# Patient Record
Sex: FEMALE | Race: WHITE | NOT HISPANIC OR LATINO | Employment: FULL TIME | URBAN - METROPOLITAN AREA
[De-identification: names, ages, dates, MRNs, and addresses within clinical notes are randomized per-mention and may not be internally consistent; named-entity substitution may affect disease eponyms.]

---

## 2019-06-07 ENCOUNTER — OFFICE VISIT CONVERTED (OUTPATIENT)
Dept: INTERNAL MEDICINE | Facility: CLINIC | Age: 29
End: 2019-06-07
Attending: INTERNAL MEDICINE

## 2019-07-29 ENCOUNTER — HOSPITAL ENCOUNTER (OUTPATIENT)
Dept: LAB | Facility: HOSPITAL | Age: 29
Discharge: HOME | End: 2019-07-29

## 2019-07-29 ENCOUNTER — OFFICE VISIT CONVERTED (OUTPATIENT)
Dept: INTERNAL MEDICINE | Facility: CLINIC | Age: 29
End: 2019-07-29
Attending: INTERNAL MEDICINE

## 2019-07-29 LAB
ALBUMIN SERPL-MCNC: 4.3 G/DL (ref 3.5–5)
ALBUMIN/GLOB SERPL: 1.4 {RATIO} (ref 1.4–2.6)
ALP SERPL-CCNC: 69 U/L (ref 42–98)
ALT SERPL-CCNC: 10 U/L (ref 10–40)
ANION GAP SERPL CALC-SCNC: 20 MMOL/L (ref 8–19)
AST SERPL-CCNC: 16 U/L (ref 15–50)
BASOPHILS # BLD AUTO: 0.05 10*3/UL (ref 0–0.2)
BASOPHILS NFR BLD AUTO: 0.8 % (ref 0–3)
BILIRUB SERPL-MCNC: 0.38 MG/DL (ref 0.2–1.3)
BUN SERPL-MCNC: 8 MG/DL (ref 5–25)
BUN/CREAT SERPL: 12 {RATIO} (ref 6–20)
CALCIUM SERPL-MCNC: 9 MG/DL (ref 8.7–10.4)
CHLORIDE SERPL-SCNC: 99 MMOL/L (ref 99–111)
CHOLEST SERPL-MCNC: 135 MG/DL (ref 107–200)
CHOLEST/HDLC SERPL: 2.5 {RATIO} (ref 3–6)
CONV ABS IMM GRAN: 0.01 10*3/UL (ref 0–0.2)
CONV CO2: 22 MMOL/L (ref 22–32)
CONV IMMATURE GRAN: 0.2 % (ref 0–1.8)
CONV TOTAL PROTEIN: 7.3 G/DL (ref 6.3–8.2)
CREAT UR-MCNC: 0.69 MG/DL (ref 0.5–0.9)
DEPRECATED RDW RBC AUTO: 46.4 FL (ref 36.4–46.3)
EOSINOPHIL # BLD AUTO: 0.11 10*3/UL (ref 0–0.7)
EOSINOPHIL # BLD AUTO: 1.8 % (ref 0–7)
ERYTHROCYTE [DISTWIDTH] IN BLOOD BY AUTOMATED COUNT: 15.2 % (ref 11.7–14.4)
GFR SERPLBLD BASED ON 1.73 SQ M-ARVRAT: >60 ML/MIN/{1.73_M2}
GLOBULIN UR ELPH-MCNC: 3 G/DL (ref 2–3.5)
GLUCOSE SERPL-MCNC: 82 MG/DL (ref 65–99)
HBA1C MFR BLD: 11.6 G/DL (ref 12–16)
HCT VFR BLD AUTO: 37.7 % (ref 37–47)
HDLC SERPL-MCNC: 55 MG/DL (ref 40–60)
LDLC SERPL CALC-MCNC: 66 MG/DL (ref 70–100)
LYMPHOCYTES # BLD AUTO: 2.62 10*3/UL (ref 1–5)
MCH RBC QN AUTO: 25.7 PG (ref 27–31)
MCHC RBC AUTO-ENTMCNC: 30.8 G/DL (ref 33–37)
MCV RBC AUTO: 83.6 FL (ref 81–99)
MONOCYTES # BLD AUTO: 0.4 10*3/UL (ref 0.2–1.2)
MONOCYTES NFR BLD AUTO: 6.7 % (ref 3–10)
NEUTROPHILS # BLD AUTO: 2.77 10*3/UL (ref 2–8)
NEUTROPHILS NFR BLD AUTO: 46.5 % (ref 30–85)
NRBC CBCN: 0 % (ref 0–0.7)
OSMOLALITY SERPL CALC.SUM OF ELEC: 281 MOSM/KG (ref 273–304)
PLATELET # BLD AUTO: 348 10*3/UL (ref 130–400)
PMV BLD AUTO: 9.6 FL (ref 9.4–12.3)
POTASSIUM SERPL-SCNC: 3.7 MMOL/L (ref 3.5–5.3)
RBC # BLD AUTO: 4.51 10*6/UL (ref 4.2–5.4)
SODIUM SERPL-SCNC: 137 MMOL/L (ref 135–147)
TRIGL SERPL-MCNC: 72 MG/DL (ref 40–150)
TSH SERPL-ACNC: 2.24 M[IU]/L (ref 0.27–4.2)
VARIANT LYMPHS NFR BLD MANUAL: 44 % (ref 20–45)
VLDLC SERPL-MCNC: 14 MG/DL (ref 5–37)
WBC # BLD AUTO: 5.96 10*3/UL (ref 4.8–10.8)

## 2019-11-20 ENCOUNTER — HOSPITAL ENCOUNTER (OUTPATIENT)
Dept: LAB | Facility: HOSPITAL | Age: 29
Discharge: HOME | End: 2019-11-20
Attending: INTERNAL MEDICINE

## 2019-11-20 LAB
BASOPHILS # BLD AUTO: 0.04 10*3/UL (ref 0–0.2)
BASOPHILS NFR BLD AUTO: 0.7 % (ref 0–3)
CONV ABS IMM GRAN: 0.01 10*3/UL (ref 0–0.2)
CONV IMMATURE GRAN: 0.2 % (ref 0–1.8)
DEPRECATED RDW RBC AUTO: 42.4 FL (ref 36.4–46.3)
EOSINOPHIL # BLD AUTO: 0.1 10*3/UL (ref 0–0.7)
EOSINOPHIL # BLD AUTO: 1.7 % (ref 0–7)
ERYTHROCYTE [DISTWIDTH] IN BLOOD BY AUTOMATED COUNT: 13.7 % (ref 11.7–14.4)
FOLATE SERPL-MCNC: 11.3 NG/ML (ref 4.8–20)
HCT VFR BLD AUTO: 37.5 % (ref 37–47)
HGB BLD-MCNC: 12.4 G/DL (ref 12–16)
IRON SATN MFR SERPL: 10 % (ref 20–55)
IRON SERPL-MCNC: 47 UG/DL (ref 60–170)
LYMPHOCYTES # BLD AUTO: 2.49 10*3/UL (ref 1–5)
LYMPHOCYTES NFR BLD AUTO: 42.7 % (ref 20–45)
MCH RBC QN AUTO: 28.4 PG (ref 27–31)
MCHC RBC AUTO-ENTMCNC: 33.1 G/DL (ref 33–37)
MCV RBC AUTO: 85.8 FL (ref 81–99)
MONOCYTES # BLD AUTO: 0.35 10*3/UL (ref 0.2–1.2)
MONOCYTES NFR BLD AUTO: 6 % (ref 3–10)
NEUTROPHILS # BLD AUTO: 2.84 10*3/UL (ref 2–8)
NEUTROPHILS NFR BLD AUTO: 48.7 % (ref 30–85)
NRBC CBCN: 0 % (ref 0–0.7)
PLATELET # BLD AUTO: 369 10*3/UL (ref 130–400)
PMV BLD AUTO: 9.7 FL (ref 9.4–12.3)
RBC # BLD AUTO: 4.37 10*6/UL (ref 4.2–5.4)
TIBC SERPL-MCNC: 469 UG/DL (ref 245–450)
TRANSFERRIN SERPL-MCNC: 328 MG/DL (ref 250–380)
VIT B12 SERPL-MCNC: 379 PG/ML (ref 211–911)
WBC # BLD AUTO: 5.83 10*3/UL (ref 4.8–10.8)

## 2019-11-21 ENCOUNTER — OFFICE VISIT CONVERTED (OUTPATIENT)
Dept: INTERNAL MEDICINE | Facility: CLINIC | Age: 29
End: 2019-11-21
Attending: INTERNAL MEDICINE

## 2019-11-27 ENCOUNTER — HOSPITAL ENCOUNTER (OUTPATIENT)
Dept: LAB | Facility: HOSPITAL | Age: 29
Discharge: HOME | End: 2019-11-27
Attending: INTERNAL MEDICINE

## 2019-11-27 LAB
T4 FREE SERPL-MCNC: 1.3 NG/DL (ref 0.9–1.8)
TSH SERPL-ACNC: 1.65 M[IU]/L (ref 0.27–4.2)

## 2019-12-06 ENCOUNTER — HOSPITAL ENCOUNTER (OUTPATIENT)
Dept: OTHER | Facility: HOSPITAL | Age: 29
Discharge: HOME | End: 2019-12-06
Attending: PHYSICIAN ASSISTANT

## 2019-12-06 ENCOUNTER — OFFICE VISIT CONVERTED (OUTPATIENT)
Dept: INTERNAL MEDICINE | Facility: CLINIC | Age: 29
End: 2019-12-06
Attending: PHYSICIAN ASSISTANT

## 2019-12-06 ENCOUNTER — CONVERSION ENCOUNTER (OUTPATIENT)
Dept: INTERNAL MEDICINE | Facility: CLINIC | Age: 29
End: 2019-12-06

## 2019-12-06 LAB
APPEARANCE UR: CLEAR
BILIRUB UR QL: NEGATIVE
COLOR UR: YELLOW
CONV COLLECTION SOURCE (UA): NORMAL
CONV UROBILINOGEN IN URINE BY AUTOMATED TEST STRIP: 0.2 {EHRLICHU}/DL (ref 0.1–1)
GLUCOSE UR QL: NEGATIVE MG/DL
HGB UR QL STRIP: NEGATIVE
KETONES UR QL STRIP: NEGATIVE MG/DL
LEUKOCYTE ESTERASE UR QL STRIP: NEGATIVE
NITRITE UR QL STRIP: NEGATIVE
PH UR STRIP.AUTO: 6 [PH] (ref 5–8)
PROT UR QL: NEGATIVE MG/DL
SP GR UR: 1.02 (ref 1–1.03)

## 2019-12-08 LAB — BACTERIA UR CULT: NORMAL

## 2020-01-02 ENCOUNTER — HOSPITAL ENCOUNTER (OUTPATIENT)
Dept: OTHER | Facility: HOSPITAL | Age: 30
Discharge: HOME | End: 2020-01-02
Attending: OBSTETRICS & GYNECOLOGY

## 2020-01-07 LAB
CONV LAST MENSTURAL PERIOD: NORMAL
SPECIMEN SOURCE: NORMAL
SPECIMEN SOURCE: NORMAL
THIN PREP CVX: NORMAL

## 2020-07-14 ENCOUNTER — HOSPITAL ENCOUNTER (OUTPATIENT)
Dept: URGENT CARE | Facility: CLINIC | Age: 30
Discharge: HOME | End: 2020-07-14
Attending: NURSE PRACTITIONER

## 2020-07-14 ENCOUNTER — HOSPITAL ENCOUNTER (OUTPATIENT)
Dept: OTHER | Facility: HOSPITAL | Age: 30
Discharge: HOME | End: 2020-07-14

## 2020-07-14 LAB
ALBUMIN SERPL-MCNC: 4 G/DL (ref 3.5–5)
ALBUMIN/GLOB SERPL: 1.4 {RATIO} (ref 1.4–2.6)
ALP SERPL-CCNC: 59 U/L (ref 42–98)
ALT SERPL-CCNC: 11 U/L (ref 10–40)
ANION GAP SERPL CALC-SCNC: 15 MMOL/L (ref 8–19)
AST SERPL-CCNC: 15 U/L (ref 15–50)
BASOPHILS # BLD AUTO: 0.05 10*3/UL (ref 0–0.2)
BASOPHILS NFR BLD AUTO: 0.4 % (ref 0–3)
BILIRUB SERPL-MCNC: 0.42 MG/DL (ref 0.2–1.3)
BUN SERPL-MCNC: 7 MG/DL (ref 5–25)
BUN/CREAT SERPL: 10 {RATIO} (ref 6–20)
CALCIUM SERPL-MCNC: 8.9 MG/DL (ref 8.7–10.4)
CHLORIDE SERPL-SCNC: 101 MMOL/L (ref 99–111)
CHOLEST SERPL-MCNC: 157 MG/DL (ref 107–200)
CHOLEST/HDLC SERPL: 2 {RATIO} (ref 3–6)
CONV ABS IMM GRAN: 0.03 10*3/UL (ref 0–0.2)
CONV CO2: 24 MMOL/L (ref 22–32)
CONV IMMATURE GRAN: 0.3 % (ref 0–1.8)
CONV TOTAL PROTEIN: 6.9 G/DL (ref 6.3–8.2)
CREAT UR-MCNC: 0.73 MG/DL (ref 0.5–0.9)
DEPRECATED RDW RBC AUTO: 42.5 FL (ref 36.4–46.3)
EOSINOPHIL # BLD AUTO: 0.14 10*3/UL (ref 0–0.7)
EOSINOPHIL # BLD AUTO: 1.2 % (ref 0–7)
ERYTHROCYTE [DISTWIDTH] IN BLOOD BY AUTOMATED COUNT: 12.9 % (ref 11.7–14.4)
GFR SERPLBLD BASED ON 1.73 SQ M-ARVRAT: >60 ML/MIN/{1.73_M2}
GLOBULIN UR ELPH-MCNC: 2.9 G/DL (ref 2–3.5)
GLUCOSE SERPL-MCNC: 86 MG/DL (ref 65–99)
HCT VFR BLD AUTO: 40.3 % (ref 37–47)
HDLC SERPL-MCNC: 78 MG/DL (ref 40–60)
HGB BLD-MCNC: 13 G/DL (ref 12–16)
LDLC SERPL CALC-MCNC: 55 MG/DL (ref 70–100)
LYMPHOCYTES # BLD AUTO: 1.71 10*3/UL (ref 1–5)
LYMPHOCYTES NFR BLD AUTO: 14.9 % (ref 20–45)
MCH RBC QN AUTO: 28.6 PG (ref 27–31)
MCHC RBC AUTO-ENTMCNC: 32.3 G/DL (ref 33–37)
MCV RBC AUTO: 88.8 FL (ref 81–99)
MONOCYTES # BLD AUTO: 0.59 10*3/UL (ref 0.2–1.2)
MONOCYTES NFR BLD AUTO: 5.1 % (ref 3–10)
NEUTROPHILS # BLD AUTO: 8.94 10*3/UL (ref 2–8)
NEUTROPHILS NFR BLD AUTO: 78.1 % (ref 30–85)
NRBC CBCN: 0 % (ref 0–0.7)
OSMOLALITY SERPL CALC.SUM OF ELEC: 279 MOSM/KG (ref 273–304)
PLATELET # BLD AUTO: 292 10*3/UL (ref 130–400)
PMV BLD AUTO: 9.2 FL (ref 9.4–12.3)
POTASSIUM SERPL-SCNC: 4.4 MMOL/L (ref 3.5–5.3)
RBC # BLD AUTO: 4.54 10*6/UL (ref 4.2–5.4)
SODIUM SERPL-SCNC: 136 MMOL/L (ref 135–147)
TRIGL SERPL-MCNC: 122 MG/DL (ref 40–150)
TSH SERPL-ACNC: 2.21 M[IU]/L (ref 0.27–4.2)
VLDLC SERPL-MCNC: 24 MG/DL (ref 5–37)
WBC # BLD AUTO: 11.46 10*3/UL (ref 4.8–10.8)

## 2020-07-15 ENCOUNTER — HOSPITAL ENCOUNTER (OUTPATIENT)
Dept: URGENT CARE | Facility: CLINIC | Age: 30
Discharge: HOME | End: 2020-07-15
Attending: NURSE PRACTITIONER

## 2020-07-15 LAB — SARS-COV-2 RNA SPEC QL NAA+PROBE: NOT DETECTED

## 2020-07-16 LAB — SARS-COV-2 RNA SPEC QL NAA+PROBE: NOT DETECTED

## 2020-10-29 ENCOUNTER — CONVERSION ENCOUNTER (OUTPATIENT)
Dept: PLASTIC SURGERY | Facility: CLINIC | Age: 30
End: 2020-10-29

## 2020-10-29 ENCOUNTER — OFFICE VISIT CONVERTED (OUTPATIENT)
Dept: PLASTIC SURGERY | Facility: CLINIC | Age: 30
End: 2020-10-29
Attending: PLASTIC SURGERY

## 2020-11-04 ENCOUNTER — TELEMEDICINE CONVERTED (OUTPATIENT)
Dept: INTERNAL MEDICINE | Facility: CLINIC | Age: 30
End: 2020-11-04
Attending: INTERNAL MEDICINE

## 2021-03-03 ENCOUNTER — HOSPITAL ENCOUNTER (OUTPATIENT)
Dept: OTHER | Facility: HOSPITAL | Age: 31
Discharge: HOME | End: 2021-03-03
Attending: OBSTETRICS & GYNECOLOGY

## 2021-04-14 ENCOUNTER — HOSPITAL ENCOUNTER (OUTPATIENT)
Dept: LAB | Facility: HOSPITAL | Age: 31
Discharge: HOME | End: 2021-04-14

## 2021-04-14 LAB
T4 FREE SERPL-MCNC: 1.3 NG/DL (ref 0.9–1.8)
TSH SERPL-ACNC: 1.43 M[IU]/L (ref 0.27–4.2)

## 2021-04-15 LAB
CONV ANTI MICROSOMAL AB: <9 IU/ML (ref 0–34)
T3FREE SERPL-MCNC: 3.6 PG/ML (ref 2–4.4)
THYROGLOBULIN ANTIBODY: <1 IU/ML (ref 0–0.9)

## 2021-04-20 LAB — T3REVERSE SERPL-MCNC: 21.9 NG/DL (ref 9.2–24.1)

## 2021-05-10 NOTE — H&P
"   History and Physical      Patient Name: Brittani Rosas   Patient ID: [id number]   Sex: Female   YOB: 1990    Primary Care Provider: Mabel England MD    Visit Date: 2020    Provider: Renay Lezama MD   Location: [facility]   Location Address: [address] [place] [address]  [id number]   Location Phone: [phone]          History Of Present Illness  Brittani Rosas is a 30 year old /White female who presents to the office today as a consult from REFERRING CARE PROVIDER NAME. Would like to discuss skin care, botox, and possible in office lipo. Had botox 6 months ago.       Past Medical History  Acne; Allergies; Anemia; Anxiety; Depression (emotion); Facial aging; History of cold sores         Past Surgical History  [place] Tooth Extraction         Medication List  Ortho Tri-Cyclen (28) 0.18/0.215/0.25 mg-35 mcg (28) oral tablet; sertraline 100 mg oral tablet         Allergy List  Shellfish allergy; SULFA (SULFONAMIDES)         Family Medical History  Heart Disease         Reproductive History   1 Para 1 0 0 0       Social History  Alcohol (Light); Tobacco (Never)         Review of Systems  · Cardiovascular  o Denies  o : chest pain, lower extremity edema, Heart Attack, Abnormal EKG  · Respiratory  o Denies  o : shortness of breath, wheezing, cough  · Neurologic  o Denies  o : muscular weakness, incoordination, tingling or numbness, headaches  · Psychiatric  o Denies  o : anxiety, depression, difficulty sleeping      Vitals  Date Time BP Position Site L\R Cuff Size HR RR TEMP (F) WT  HT  BMI kg/m2 BSA m2 O2 Sat FR L/min FiO2        10/29/2020 03:07 /82 Sitting    100 - R  98.2  5'  3\"   97 %  21%          Physical Examination  · Head and Face  o Face  o :   § Inspection  § : Dynamic greater than static rhytids of forehead, glabella and periorbital area, scattered brown spots with 1 larger 5mm round spot just above right " eyebrow; fullness submental area above platysma  o Visia  o :   § Spots  § : 56%  § Wrinkles  § : 17%  § Texture  § : 58%  § Pores  § : 63%  § UV Spots  § : 27%  § Brown Spots  § : 15%  § Red Areas  § : 14%  § Porphyrins  § : 54%  · Respiratory  o Respiratory Effort  o : breathing unlabored   · Cardiovascular  o Heart  o : regular rate          Assessment  · Facial aging     701.8/R23.8      Plan  · Orders  o Plastics cosmetic visit ([facility]) - - 10/29/2020  · Medications  o Medications have been Reconciled  o Transition of Care or Provider Policy  · Instructions  o Photos were obtained  o VISIA analysis was obtained and discussed with the patient.   o Will get quote for lipo. Started on GSR, DPD, and purchased Wrinkle TXT Repair, prescription given and discussed retinol side effects.  o Electronically Identified Patient Education Materials Provided Electronically            Electronically Signed by: Renay Lezama MD -Author on October 30, 2020 11:40:55 AM

## 2021-05-13 NOTE — PROGRESS NOTES
"   Progress Note      Patient Name: Brittani Rosas   Patient ID: [id number]   Sex: Female   YOB: 1990    Primary Care Provider: Mabel England MD    Visit Date: November 4, 2020    Provider: Mabel England MD   Location: [facility]   Location Address: [address] [place] [address] 3  [place], [place]  [id number]   Location Phone: [phone]          Chief Complaint  · \"I am following up on my Zoloft\"      History Of Present Illness  Video Conferencing Visit  Brittani Rosas is a 30 year old /White female who is presenting for evaluation via video conferencing via Junar. Verbal consent obtained before beginning visit.   The following staff were present during this visit: none   Brittani Rosas is a 30 year old /White female who presents for evaluation and treatment of:      Zoloft working well  no chest pain  no trouble breathing  does feel like she is constantly clearing her throat  she  tried Zyrtec without relief         Past Medical History  Disease Name Date Onset Notes   Acne --  --    Allergies --  --    Anemia --  --    Anxiety --  --    Depression (emotion) --  --    Facial aging --  --    History of cold sores --  --          Past Surgical History  Procedure Name Date Notes   [place] Tooth Extraction 2007 --          Medication List  Name Date Started Instructions   Allegra Allergy 180 mg oral tablet 11/04/2020 take 1 tablet (180 mg) by oral route once daily   omeprazole 40 mg oral capsule,delayed release(DR/EC) 11/04/2020 Take 1 capsule by mouth once daily   Ortho Tri-Cyclen (28) 0.18/0.215/0.25 mg-35 mcg (28) oral tablet  take 1 tablet by oral route once daily   sertraline 100 mg oral tablet 11/04/2020 TAKE 1 TABLET BY MOUTH EVERY DAY         Allergy List  Allergen Name Date Reaction Notes   Shellfish allergy --  --  --    SULFA (SULFONAMIDES) --  --  --        Allergies Reconciled  Family Medical History  Disease Name Relative/Age Notes   Heart Disease "  --          Reproductive History  Menstrual   Last Menstrual Period: 2019   Pregnancy Summary   Total Pregnancies: 1 Full Term: 1 Premature: 0   Ab Induced: 0 Ab Spontaneous: 0 Ectopics: 0   Multiples: 0 Livin         Social History  Finding Status Start/Stop Quantity Notes   Alcohol Light --/-- --  --    Tobacco Never --/-- --  --          Review of Systems  · Constitutional  o Denies  o : fever, fatigue, weight loss, weight gain  · Cardiovascular  o Denies  o : lower extremity edema, claudication, chest pressure, palpitations  · Respiratory  o Denies  o : shortness of breath, wheezing, frequent cough, hemoptysis, dyspnea on exertion  · Gastrointestinal  o Denies  o : nausea, vomiting, diarrhea, constipation, abdominal pain      Physical Examination     Gen: well-nourished, no acute distress  HENT: atraumatic, normocephalic  Eyes: extraocular movements intact, no scleral icterus  Lung: breathing comfortably, no cough  Skin: no visible rash, no lesions  Neuro: grossly oriented to person, place, and time. no facial droop   Psych: normal mood and affect             Assessment  · Anxiety     300.02/F41.1  cont zoloft    Problems Reconciled  Plan  · Orders  o ACO-39: Current medications updated and reviewed (1159F, ) - - 2020  · Medications  o Medications have been Reconciled  o Transition of Care or Provider Policy  · Instructions  o Patient was educated/instructed on their diagnosis, treatment and medications prior to discharge from the clinic today.            Electronically Signed by: Mabel England MD -Author on 2020 10:08:41 PM

## 2021-05-14 ENCOUNTER — OFFICE VISIT CONVERTED (OUTPATIENT)
Dept: INTERNAL MEDICINE | Facility: CLINIC | Age: 31
End: 2021-05-14
Attending: INTERNAL MEDICINE

## 2021-05-14 VITALS
OXYGEN SATURATION: 97 % | TEMPERATURE: 98.2 F | HEART RATE: 100 BPM | HEIGHT: 63 IN | SYSTOLIC BLOOD PRESSURE: 134 MMHG | DIASTOLIC BLOOD PRESSURE: 82 MMHG

## 2021-05-15 VITALS
BODY MASS INDEX: 24.85 KG/M2 | OXYGEN SATURATION: 99 % | TEMPERATURE: 99.3 F | HEIGHT: 63 IN | DIASTOLIC BLOOD PRESSURE: 68 MMHG | SYSTOLIC BLOOD PRESSURE: 92 MMHG | WEIGHT: 140.25 LBS | HEART RATE: 109 BPM

## 2021-05-15 VITALS
HEART RATE: 80 BPM | WEIGHT: 143 LBS | OXYGEN SATURATION: 98 % | RESPIRATION RATE: 15 BRPM | TEMPERATURE: 98 F | DIASTOLIC BLOOD PRESSURE: 72 MMHG | SYSTOLIC BLOOD PRESSURE: 112 MMHG

## 2021-05-15 VITALS
OXYGEN SATURATION: 98 % | HEART RATE: 77 BPM | SYSTOLIC BLOOD PRESSURE: 118 MMHG | DIASTOLIC BLOOD PRESSURE: 72 MMHG | HEIGHT: 63 IN | BODY MASS INDEX: 24.85 KG/M2 | WEIGHT: 140.25 LBS | TEMPERATURE: 97.7 F

## 2021-05-15 VITALS
OXYGEN SATURATION: 99 % | DIASTOLIC BLOOD PRESSURE: 68 MMHG | HEIGHT: 63 IN | TEMPERATURE: 98.2 F | HEART RATE: 87 BPM | SYSTOLIC BLOOD PRESSURE: 112 MMHG | WEIGHT: 143 LBS | BODY MASS INDEX: 25.34 KG/M2

## 2021-06-05 NOTE — PROGRESS NOTES
"   Progress Note      Patient Name: Brittani Rosas   Patient ID: [id number]   Sex: Female   YOB: 1990    Primary Care Provider: Mabel England MD    Visit Date: May 14, 2021    Provider: Mabel England MD   Location: [facility]   Location Address: [address] [place] [address]  [id number]   Location Phone: [phone]          Chief Complaint  · \"anxiety follow up\"  · \"discuss thyroid\"      History Of Present Illness  Brittani Rosas is a 30 year old /White female who presents for evaluation and treatment of:      Chronic issues    Anxiety  Doing great on current medication  No chest pain  No trouble breathing    Having some neck stiffness  States that she went to a chiropractor and he ordered an x-ray    omeprazole helped with swallowing sensation       Past Medical History  Disease Name Date Onset Notes   Acne --  --    Allergies --  --    Anemia --  --    Anxiety --  --    Depression (emotion) --  --    Facial aging --  --    History of cold sores --  --          Past Surgical History  Procedure Name Date Notes   [place] Tooth Extraction 2007 --          Medication List  Name Date Started Instructions   Allegra Allergy 180 mg oral tablet 11/04/2020 take 1 tablet (180 mg) by oral route once daily   omeprazole 20 mg oral capsule,delayed release(DR/EC) 05/14/2021 take 1 capsule (20 mg) by oral route once daily before a meal   Ortho Tri-Cyclen (28) 0.18/0.215/0.25 mg-35 mcg (28) oral tablet  take 1 tablet by oral route once daily   SERTRALINE  MG TABLET 05/11/2021 TAKE 1 TABLET BY MOUTH EVERY DAY         Allergy List  Allergen Name Date Reaction Notes   Shellfish allergy --  --  --    SULFA (SULFONAMIDES) --  --  --        Allergies Reconciled  Family Medical History  Disease Name Relative/Age Notes   Heart Disease  --          Reproductive History  Menstrual   Last Menstrual Period: 06/03/2019   Pregnancy Summary   Total Pregnancies: 1 Full Term: 1 " "Premature: 0   Ab Induced: 0 Ab Spontaneous: 0 Ectopics: 0   Multiples: 0 Livin         Social History  Finding Status Start/Stop Quantity Notes   Alcohol Light --/-- --  --    Tobacco Never --/-- --  --          Vitals  Date Time BP Position Site L\R Cuff Size HR RR TEMP (F) WT  HT  BMI kg/m2 BSA m2 O2 Sat FR L/min FiO2 HC       2019 04:36 /68 Sitting    87 - R  98.2 143lbs 0oz 5'  3\" 25.33 1.7 99 %      2019 05:18 /72 Sitting    80 - R 15 98 143lbs 0oz    98 %      10/29/2020 03:07 /82 Sitting    100 - R  98.2  5'  3\"   97 %  21%    2021 04:22 /76 Sitting    102 - R  98.1 161lbs 0oz 5'  3\" 28.52 1.8 99 %  21%          Physical Examination  · Constitutional  o Appearance  o : no acute distress, well-nourished  · Head and Face  o Head  o :   § Inspection  § : atraumatic, normocephalic  · Eyes  o Eyes  o : extraocular movements intact, no scleral icterus, no conjunctival injection  · Respiratory  o Respiratory Effort  o : breathing comfortably, symmetric chest rise  o Auscultation of Lungs  o : clear to asculatation bilaterally, no wheezes, rales, or rhonchii  · Cardiovascular  o Heart  o :   § Auscultation of Heart  § : regular rate and rhythm, no murmurs, rubs, or gallops  o Peripheral Vascular System  o :   § Extremities  § : no edema  · Neurologic  o Mental Status Examination  o :   § Orientation  § : grossly oriented to person, place and time  o Gait and Station  o :   § Gait Screening  § : normal gait  · Psychiatric  o General  o : normal mood and affect          Assessment  · Anxiety     300.02/F41.1  Doing great on current medication  · Depression (emotion)     311/F32.9  · Screening for depression     V79.0/Z13.89  · GERD (gastroesophageal reflux disease)     530.81/K21.9  Will adjust omeprazole    Problems Reconciled  Plan  · Orders  o ACO-18: Negative screen for clinical depression using a standardized tool () - - 2021  o ACO-39: Current medications " updated and reviewed (1159F, ) - - 05/14/2021  · Medications  o omeprazole 20 mg oral capsule,delayed release(DR/EC)   SIG: take 1 capsule (20 mg) by oral route once daily before a meal   DISP: (90) Capsule with 0 refills  Adjusted on 05/14/2021     o Medications have been Reconciled  o Transition of Care or Provider Policy  · Instructions  o Depression Screen completed and scanned into the EMR under the designated folder within the patient's documents.  o Today's PHQ-9 result is _5__  o Patient was educated/instructed on their diagnosis, treatment and medications prior to discharge from the clinic today.            Electronically Signed by: Mabel England MD -Author on May 30, 2021 03:11:04 PM

## 2021-06-16 RX ORDER — SERTRALINE HYDROCHLORIDE 100 MG/1
100 TABLET, FILM COATED ORAL DAILY
COMMUNITY
Start: 2021-05-11 | End: 2021-06-16 | Stop reason: SDUPTHER

## 2021-06-17 RX ORDER — SERTRALINE HYDROCHLORIDE 100 MG/1
100 TABLET, FILM COATED ORAL DAILY
Qty: 90 TABLET | Refills: 2 | Status: SHIPPED | OUTPATIENT
Start: 2021-06-17 | End: 2021-07-16

## 2021-06-28 ENCOUNTER — TRANSCRIBE ORDERS (OUTPATIENT)
Dept: ADMINISTRATIVE | Facility: HOSPITAL | Age: 31
End: 2021-06-28

## 2021-06-28 ENCOUNTER — LAB (OUTPATIENT)
Dept: LAB | Facility: HOSPITAL | Age: 31
End: 2021-06-28

## 2021-06-28 DIAGNOSIS — Z00.00 ROUTINE GENERAL MEDICAL EXAMINATION AT A HEALTH CARE FACILITY: Primary | ICD-10-CM

## 2021-06-28 DIAGNOSIS — Z00.00 ROUTINE GENERAL MEDICAL EXAMINATION AT A HEALTH CARE FACILITY: ICD-10-CM

## 2021-06-28 LAB — HBV SURFACE AB SER RIA-ACNC: REACTIVE

## 2021-06-28 PROCEDURE — 36415 COLL VENOUS BLD VENIPUNCTURE: CPT

## 2021-06-28 PROCEDURE — 86706 HEP B SURFACE ANTIBODY: CPT

## 2021-07-15 VITALS
OXYGEN SATURATION: 99 % | SYSTOLIC BLOOD PRESSURE: 108 MMHG | HEIGHT: 63 IN | HEART RATE: 102 BPM | DIASTOLIC BLOOD PRESSURE: 76 MMHG | BODY MASS INDEX: 28.53 KG/M2 | TEMPERATURE: 98.1 F | WEIGHT: 161 LBS

## 2021-07-16 RX ORDER — SERTRALINE HYDROCHLORIDE 100 MG/1
100 TABLET, FILM COATED ORAL DAILY
Qty: 30 TABLET | Refills: 0 | Status: SHIPPED | OUTPATIENT
Start: 2021-07-16 | End: 2023-03-31

## 2021-07-23 DIAGNOSIS — Z13.9 SCREENING FOR CONDITION: Primary | ICD-10-CM

## 2021-07-23 PROCEDURE — 86580 TB INTRADERMAL TEST: CPT | Performed by: INTERNAL MEDICINE

## 2021-09-18 ENCOUNTER — TRANSCRIBE ORDERS (OUTPATIENT)
Dept: LAB | Facility: HOSPITAL | Age: 31
End: 2021-09-18

## 2021-09-18 ENCOUNTER — LAB (OUTPATIENT)
Dept: LAB | Facility: HOSPITAL | Age: 31
End: 2021-09-18

## 2021-09-18 DIAGNOSIS — Z00.00 ROUTINE GENERAL MEDICAL EXAMINATION AT A HEALTH CARE FACILITY: Primary | ICD-10-CM

## 2021-09-18 DIAGNOSIS — Z00.00 ROUTINE GENERAL MEDICAL EXAMINATION AT A HEALTH CARE FACILITY: ICD-10-CM

## 2021-09-18 PROCEDURE — U0004 COV-19 TEST NON-CDC HGH THRU: HCPCS

## 2021-09-18 PROCEDURE — C9803 HOPD COVID-19 SPEC COLLECT: HCPCS

## 2021-09-19 LAB — SARS-COV-2 RNA NOSE QL NAA+PROBE: NOT DETECTED

## 2021-10-05 ENCOUNTER — TRANSCRIBE ORDERS (OUTPATIENT)
Dept: LAB | Facility: HOSPITAL | Age: 31
End: 2021-10-05

## 2021-10-05 ENCOUNTER — LAB (OUTPATIENT)
Dept: LAB | Facility: HOSPITAL | Age: 31
End: 2021-10-05

## 2021-10-05 DIAGNOSIS — Z11.52 ENCOUNTER FOR SCREENING FOR COVID-19: ICD-10-CM

## 2021-10-05 DIAGNOSIS — Z11.52 ENCOUNTER FOR SCREENING FOR COVID-19: Primary | ICD-10-CM

## 2021-10-05 LAB — SARS-COV-2 RNA RESP QL NAA+PROBE: NOT DETECTED

## 2021-10-05 PROCEDURE — C9803 HOPD COVID-19 SPEC COLLECT: HCPCS

## 2021-10-05 PROCEDURE — U0003 INFECTIOUS AGENT DETECTION BY NUCLEIC ACID (DNA OR RNA); SEVERE ACUTE RESPIRATORY SYNDROME CORONAVIRUS 2 (SARS-COV-2) (CORONAVIRUS DISEASE [COVID-19]), AMPLIFIED PROBE TECHNIQUE, MAKING USE OF HIGH THROUGHPUT TECHNOLOGIES AS DESCRIBED BY CMS-2020-01-R: HCPCS

## 2022-01-20 ENCOUNTER — TRANSCRIBE ORDERS (OUTPATIENT)
Dept: LAB | Facility: HOSPITAL | Age: 32
End: 2022-01-20

## 2022-01-20 ENCOUNTER — LAB (OUTPATIENT)
Dept: LAB | Facility: HOSPITAL | Age: 32
End: 2022-01-20

## 2022-01-20 DIAGNOSIS — Z20.822 ENCOUNTER FOR LABORATORY TESTING FOR COVID-19 VIRUS: Primary | ICD-10-CM

## 2022-01-20 DIAGNOSIS — Z20.822 ENCOUNTER FOR LABORATORY TESTING FOR COVID-19 VIRUS: ICD-10-CM

## 2022-01-20 LAB — SARS-COV-2 RNA PNL SPEC NAA+PROBE: NOT DETECTED

## 2022-01-20 PROCEDURE — C9803 HOPD COVID-19 SPEC COLLECT: HCPCS

## 2022-01-20 PROCEDURE — U0004 COV-19 TEST NON-CDC HGH THRU: HCPCS

## 2022-01-22 ENCOUNTER — DOCUMENTATION (OUTPATIENT)
Dept: INTERNAL MEDICINE | Facility: CLINIC | Age: 32
End: 2022-01-22

## 2022-01-22 RX ORDER — METHYLPREDNISOLONE 4 MG/1
TABLET ORAL
Qty: 1 EACH | Refills: 0 | Status: SHIPPED | OUTPATIENT
Start: 2022-01-22 | End: 2022-08-24

## 2022-07-14 DIAGNOSIS — Z00.00 ANNUAL PHYSICAL EXAM: Primary | ICD-10-CM

## 2022-07-14 DIAGNOSIS — R53.83 FATIGUE, UNSPECIFIED TYPE: ICD-10-CM

## 2022-07-27 ENCOUNTER — LAB (OUTPATIENT)
Dept: LAB | Facility: HOSPITAL | Age: 32
End: 2022-07-27

## 2022-07-27 LAB
25(OH)D3 SERPL-MCNC: 37.3 NG/ML (ref 30–100)
ALBUMIN SERPL-MCNC: 4.4 G/DL (ref 3.5–5.2)
ALBUMIN/GLOB SERPL: 2 G/DL
ALP SERPL-CCNC: 72 U/L (ref 39–117)
ALT SERPL W P-5'-P-CCNC: 8 U/L (ref 1–33)
ANION GAP SERPL CALCULATED.3IONS-SCNC: 9.1 MMOL/L (ref 5–15)
AST SERPL-CCNC: 13 U/L (ref 1–32)
BASOPHILS # BLD AUTO: 0.06 10*3/MM3 (ref 0–0.2)
BASOPHILS NFR BLD AUTO: 0.7 % (ref 0–1.5)
BILIRUB SERPL-MCNC: 0.5 MG/DL (ref 0–1.2)
BUN SERPL-MCNC: 7 MG/DL (ref 6–20)
BUN/CREAT SERPL: 9.9 (ref 7–25)
CALCIUM SPEC-SCNC: 9.1 MG/DL (ref 8.6–10.5)
CHLORIDE SERPL-SCNC: 104 MMOL/L (ref 98–107)
CHOLEST SERPL-MCNC: 155 MG/DL (ref 0–200)
CO2 SERPL-SCNC: 25.9 MMOL/L (ref 22–29)
CREAT SERPL-MCNC: 0.71 MG/DL (ref 0.57–1)
DEPRECATED RDW RBC AUTO: 41 FL (ref 37–54)
EGFRCR SERPLBLD CKD-EPI 2021: 114.6 ML/MIN/1.73
EOSINOPHIL # BLD AUTO: 0.22 10*3/MM3 (ref 0–0.4)
EOSINOPHIL NFR BLD AUTO: 2.6 % (ref 0.3–6.2)
ERYTHROCYTE [DISTWIDTH] IN BLOOD BY AUTOMATED COUNT: 13.5 % (ref 12.3–15.4)
FOLATE SERPL-MCNC: 6.14 NG/ML (ref 4.78–24.2)
GLOBULIN UR ELPH-MCNC: 2.2 GM/DL
GLUCOSE SERPL-MCNC: 88 MG/DL (ref 65–99)
HCT VFR BLD AUTO: 38.9 % (ref 34–46.6)
HDLC SERPL-MCNC: 53 MG/DL (ref 40–60)
HGB BLD-MCNC: 13.2 G/DL (ref 12–15.9)
IMM GRANULOCYTES # BLD AUTO: 0.01 10*3/MM3 (ref 0–0.05)
IMM GRANULOCYTES NFR BLD AUTO: 0.1 % (ref 0–0.5)
IRON 24H UR-MRATE: 41 MCG/DL (ref 37–145)
IRON SATN MFR SERPL: 9 % (ref 20–50)
LDLC SERPL CALC-MCNC: 88 MG/DL (ref 0–100)
LDLC/HDLC SERPL: 1.65 {RATIO}
LYMPHOCYTES # BLD AUTO: 2.95 10*3/MM3 (ref 0.7–3.1)
LYMPHOCYTES NFR BLD AUTO: 34.3 % (ref 19.6–45.3)
MCH RBC QN AUTO: 28.8 PG (ref 26.6–33)
MCHC RBC AUTO-ENTMCNC: 33.9 G/DL (ref 31.5–35.7)
MCV RBC AUTO: 84.7 FL (ref 79–97)
MONOCYTES # BLD AUTO: 0.5 10*3/MM3 (ref 0.1–0.9)
MONOCYTES NFR BLD AUTO: 5.8 % (ref 5–12)
NEUTROPHILS NFR BLD AUTO: 4.85 10*3/MM3 (ref 1.7–7)
NEUTROPHILS NFR BLD AUTO: 56.5 % (ref 42.7–76)
NRBC BLD AUTO-RTO: 0 /100 WBC (ref 0–0.2)
PLATELET # BLD AUTO: 326 10*3/MM3 (ref 140–450)
PMV BLD AUTO: 9.7 FL (ref 6–12)
POTASSIUM SERPL-SCNC: 4.5 MMOL/L (ref 3.5–5.2)
PROT SERPL-MCNC: 6.6 G/DL (ref 6–8.5)
RBC # BLD AUTO: 4.59 10*6/MM3 (ref 3.77–5.28)
SODIUM SERPL-SCNC: 139 MMOL/L (ref 136–145)
T4 FREE SERPL-MCNC: 1.35 NG/DL (ref 0.93–1.7)
TIBC SERPL-MCNC: 440 MCG/DL (ref 298–536)
TRANSFERRIN SERPL-MCNC: 295 MG/DL (ref 200–360)
TRIGL SERPL-MCNC: 74 MG/DL (ref 0–150)
TSH SERPL DL<=0.05 MIU/L-ACNC: 2.44 UIU/ML (ref 0.27–4.2)
VIT B12 BLD-MCNC: 320 PG/ML (ref 211–946)
VLDLC SERPL-MCNC: 14 MG/DL (ref 5–40)
WBC NRBC COR # BLD: 8.59 10*3/MM3 (ref 3.4–10.8)

## 2022-07-27 PROCEDURE — 83540 ASSAY OF IRON: CPT

## 2022-07-27 PROCEDURE — 82306 VITAMIN D 25 HYDROXY: CPT

## 2022-07-27 PROCEDURE — 84466 ASSAY OF TRANSFERRIN: CPT

## 2022-07-27 PROCEDURE — 36415 COLL VENOUS BLD VENIPUNCTURE: CPT

## 2022-07-27 PROCEDURE — 80050 GENERAL HEALTH PANEL: CPT

## 2022-07-27 PROCEDURE — 80061 LIPID PANEL: CPT

## 2022-07-27 PROCEDURE — 82746 ASSAY OF FOLIC ACID SERUM: CPT

## 2022-07-27 PROCEDURE — 84439 ASSAY OF FREE THYROXINE: CPT

## 2022-07-27 PROCEDURE — 82607 VITAMIN B-12: CPT

## 2022-08-02 ENCOUNTER — LAB (OUTPATIENT)
Dept: LAB | Facility: HOSPITAL | Age: 32
End: 2022-08-02

## 2022-08-02 DIAGNOSIS — Z13.9 SCREENING FOR CONDITION: Primary | ICD-10-CM

## 2022-08-02 PROCEDURE — 86480 TB TEST CELL IMMUN MEASURE: CPT

## 2022-08-04 LAB
GAMMA INTERFERON BACKGROUND BLD IA-ACNC: 0.05 IU/ML
M TB IFN-G BLD-IMP: NEGATIVE
M TB IFN-G CD4+ BCKGRND COR BLD-ACNC: 0.04 IU/ML
M TB IFN-G CD4+CD8+ BCKGRND COR BLD-ACNC: 0.05 IU/ML
MITOGEN IGNF BLD-ACNC: >10 IU/ML
QUANTIFERON INCUBATION: NORMAL
SERVICE CMNT-IMP: NORMAL

## 2022-08-17 ENCOUNTER — OFFICE VISIT (OUTPATIENT)
Dept: OBSTETRICS AND GYNECOLOGY | Facility: CLINIC | Age: 32
End: 2022-08-17

## 2022-08-17 VITALS
HEART RATE: 55 BPM | BODY MASS INDEX: 27.79 KG/M2 | DIASTOLIC BLOOD PRESSURE: 82 MMHG | SYSTOLIC BLOOD PRESSURE: 120 MMHG | HEIGHT: 62 IN | WEIGHT: 151 LBS

## 2022-08-17 DIAGNOSIS — Z01.419 ENCOUNTER FOR GYNECOLOGICAL EXAMINATION WITHOUT ABNORMAL FINDING: Primary | ICD-10-CM

## 2022-08-17 PROCEDURE — G0123 SCREEN CERV/VAG THIN LAYER: HCPCS | Performed by: OBSTETRICS & GYNECOLOGY

## 2022-08-17 PROCEDURE — 99395 PREV VISIT EST AGE 18-39: CPT | Performed by: OBSTETRICS & GYNECOLOGY

## 2022-08-17 NOTE — PROGRESS NOTES
"Well Woman Visit    CC: Annual well woman exam       HPI:   32 y.o.No obstetric history on file. Contraception or HRT: Contraception:  None  Menses:   q mon  Pain:  None  Incontinence concerns: No  Hx of abnormal pap:  No  Pt has no complaints today. stopped bcp and tracking cycles. Desires pregnancy.       History: PMHx, Meds, Allergies, PSHx, Social Hx, and POBHx all reviewed and updated.      PHYSICAL EXAM:  /82   Pulse 55   Ht 157.5 cm (62\")   Wt 68.5 kg (151 lb)   LMP 08/12/2022 (Approximate)   BMI 27.62 kg/m²   General- NAD, alert and oriented, appropriate  Psych- Normal mood, good memory  Neck- No masses, no thyroid enlargement  CV- Regular rhythm, no murnurs  Resp- CTA to bases, no wheezes  Abdomen- Soft, non distended, non tender, no masses    Breast left-  Bilaterally symmetrical, no masses, non tender, no nipple discharge  Breast right- Bilaterally symmetrical, no masses, non tender, no nipple discharge    External genitalia- Normal female, no lesions  Urethra/meatus- Normal, no masses, non tender, no prolapse  Bladder- Normal, no masses, non tender, no prolapse  Vagina- Normal, no atrophy, no lesions, no discharge, no prolapse  Cvx- Normal, no lesions, no discharge, No cervical motion tenderness  Uterus- Normal size, shape & consistency.  Non tender, mobile, & no prolapse  Adnexa- No mass, non tender  Anus/Rectum/Perineum- Not performed    Lymphatic- No palpable neck, axillary, or groin nodes  Ext- No edema, no cyanosis    Skin- No lesions, no rashes, no acanthosis nigricans        ASSESSMENT and PLAN:  WWE    Diagnoses and all orders for this visit:    1. Encounter for gynecological examination without abnormal finding (Primary)  -     IGP,rfx Aptima HPV All Pth    declines bc. Discussed seeing fertility spec if no pregnancy within one year after stopping bc.     Counseling:     Track menses, RTO IF <q21d, >7d long, or heavy  PNV    Domestic violence/abuse screen: negative    Depression " screen: no SI    Preventative:   BREAST HEALTH- Monthly self breast exam importance and how to reviewed. MMG and/or MRI (prn) reviewed per society guidelines and her individual history. Mammo/MRI screen: Not medically needed.  CERVICAL CANCER Screening- Reviewed current ASCCP guidelines for screening w and wo cotest HPV, age specific.  Screen: Updated today.  COLON CANCER Screening- Reviewed current medical society guidelines and options.  Colonoscopy screen:  Not medically needed.  SEXUAL HEALTH: Declines STD screening.  VACCINATIONS Recommended: Flu annually, Gardisil/HPV vaccine (up to 44yo).  Importance discussed, risk being unvaccinated reviewed.  Questions answered  Smoking status- NON SMOKER.  Importance of avoiding second hand smoke.  Follow up PCP/Specialist PMHx and Labs  Myriad: Does not qualify.  Gardasil status: declined.       She understands the importance of having any ordered tests to be performed in a timely fashion.  She is encouraged to review her results online and/or contact or office if she has questions.     Follow Up:  Return if symptoms worsen or fail to improve.      Lorena Tobias, APRN  08/17/2022

## 2022-08-23 LAB
CONV .: NORMAL
CYTOLOGIST CVX/VAG CYTO: NORMAL
CYTOLOGY CVX/VAG DOC CYTO: NORMAL
CYTOLOGY CVX/VAG DOC THIN PREP: NORMAL
DX ICD CODE: NORMAL
HIV 1 & 2 AB SER-IMP: NORMAL
OTHER STN SPEC: NORMAL
STAT OF ADQ CVX/VAG CYTO-IMP: NORMAL

## 2022-08-24 DIAGNOSIS — H66.90 ACUTE OTITIS MEDIA, UNSPECIFIED OTITIS MEDIA TYPE: Primary | ICD-10-CM

## 2022-08-24 RX ORDER — AMOXICILLIN 500 MG/1
500 CAPSULE ORAL 2 TIMES DAILY
Qty: 20 CAPSULE | Refills: 0 | Status: SHIPPED | OUTPATIENT
Start: 2022-08-24 | End: 2022-09-03

## 2022-08-24 RX ORDER — METHYLPREDNISOLONE 4 MG/1
TABLET ORAL
Qty: 21 EACH | Refills: 0 | Status: SHIPPED | OUTPATIENT
Start: 2022-08-24 | End: 2023-03-31

## 2022-11-09 ENCOUNTER — TELEPHONE (OUTPATIENT)
Dept: OBSTETRICS AND GYNECOLOGY | Facility: CLINIC | Age: 32
End: 2022-11-09

## 2022-11-09 NOTE — TELEPHONE ENCOUNTER
Called patient to move her appt with AP to later in the day. Scheduling error by the [facility], Dr. Fregoso has 3 patients at 9:00. Patients voicemail is not set up.

## 2022-11-11 PROBLEM — Z31.9 DESIRE FOR PREGNANCY: Status: ACTIVE | Noted: 2022-11-11

## 2022-11-17 NOTE — PROGRESS NOTES
GYN Visit    Chief Complaint   Patient presents with   • Infertility       HPI:   32 y.o. LMP: Patient's last menstrual period was 2022.     Social History     Substance and Sexual Activity   Sexual Activity Yes   • Partners: Male   • Birth control/protection: None       Pt: Menses:   q mo, lasts 5 days, changes products q 3hrs on heaviest days.   Pain:  cramps and LBP, motrin resolves it.   Trying since 2021.  [place] around ovulation (+ ovulation w tests).   No STDs/PID.  No hair growth.    2016 , able to easily conceive after 8-9mo, same partner.  Del at 36weeks, uncomplicated .     Partner:  Lambert, 36y/o.  No radiation or heat.  No STDs.     History: PMHx, Meds, Allergies, PSHx, Social Hx, and POBHx all reviewed and updated.    PHYSICAL EXAM:  /72   Pulse 80   Wt 66.7 kg (147 lb)   LMP 2022   BMI 26.89 kg/m²   General- NAD, alert and oriented, appropriate  Psych- Normal mood, good memory      ASSESSMENT AND PLAN:  Diagnoses and all orders for this visit:    1. Desire for pregnancy (Primary)  -     clomiPHENE (CLOMID) 50 MG tablet; Take 1 tablet by mouth Daily. TAKE AS DIRECTED on days 5-9  Dispense: 5 tablet; Refill: 2    2. Infertility counseling  Overview:  2022 nl TSH, FT4    We discussed the R/B/A/SE/E of doing labs, US, SA and then considering clomid, vs trying for 2-3mo of clomid now.  Pt desires to try clomid first.      CLOMID- Reviewed in detail, use, R/B/A.  How to use ovulation predictor kits, timing intercourse.  Written information and clomid calendar provided and reviewed.  All questions answered.  Recommend 2-3 cycles ovulatory and consider initiate workup (TFTs, prolactin, AMH, pelvic US, SA) no pregnancy and OV w me after.        Follow Up:  Return Call no preg after 3mo clomid for labs, US, SA.    I spent 20 minutes caring for Brittani on this date of service. This time includes time spent by me in the following activities:preparing for the visit,  reviewing tests, obtaining and/or reviewing a separately obtained history, counseling and educating the patient/family/caregiver, ordering medications, tests, or procedures and documenting information in the medical record    Margaret Fregoso DO  11/18/2022    [facility]  [facility] [place] [address]  Dept: [phone]  Dept Fax: [phone]  Loc: [phone]  Loc Fax: [phone]

## 2022-11-18 ENCOUNTER — OFFICE VISIT (OUTPATIENT)
Dept: OBSTETRICS AND GYNECOLOGY | Facility: CLINIC | Age: 32
End: 2022-11-18

## 2022-11-18 VITALS
WEIGHT: 147 LBS | DIASTOLIC BLOOD PRESSURE: 72 MMHG | BODY MASS INDEX: 26.89 KG/M2 | HEART RATE: 80 BPM | SYSTOLIC BLOOD PRESSURE: 112 MMHG

## 2022-11-18 DIAGNOSIS — Z31.69 INFERTILITY COUNSELING: ICD-10-CM

## 2022-11-18 DIAGNOSIS — Z31.9 DESIRE FOR PREGNANCY: Primary | ICD-10-CM

## 2022-11-18 PROCEDURE — 99213 OFFICE O/P EST LOW 20 MIN: CPT | Performed by: OBSTETRICS & GYNECOLOGY

## 2022-11-30 RX ORDER — METRONIDAZOLE 500 MG/1
500 TABLET ORAL 2 TIMES DAILY
Qty: 14 TABLET | Refills: 0 | Status: SHIPPED | OUTPATIENT
Start: 2022-11-30 | End: 2022-12-07

## 2022-11-30 RX ORDER — FLUCONAZOLE 150 MG/1
150 TABLET ORAL
Qty: 2 TABLET | Refills: 0 | Status: SHIPPED | OUTPATIENT
Start: 2022-11-30 | End: 2022-12-04

## 2023-02-20 RX ORDER — FLUCONAZOLE 150 MG/1
150 TABLET ORAL
Qty: 2 TABLET | Refills: 0 | Status: SHIPPED | OUTPATIENT
Start: 2023-02-20 | End: 2023-02-20 | Stop reason: SDUPTHER

## 2023-02-20 RX ORDER — METRONIDAZOLE 500 MG/1
500 TABLET ORAL 2 TIMES DAILY
Qty: 14 TABLET | Refills: 0 | Status: SHIPPED | OUTPATIENT
Start: 2023-02-20 | End: 2023-02-27

## 2023-02-20 RX ORDER — METRONIDAZOLE 500 MG/1
500 TABLET ORAL 2 TIMES DAILY
Qty: 14 TABLET | Refills: 0 | Status: SHIPPED | OUTPATIENT
Start: 2023-02-20 | End: 2023-02-20 | Stop reason: SDUPTHER

## 2023-02-20 RX ORDER — FLUCONAZOLE 150 MG/1
150 TABLET ORAL
Qty: 2 TABLET | Refills: 0 | Status: SHIPPED | OUTPATIENT
Start: 2023-02-20 | End: 2023-02-24

## 2023-03-04 PROBLEM — I49.3 PVC (PREMATURE VENTRICULAR CONTRACTION): Status: ACTIVE | Noted: 2023-03-04

## 2023-03-31 ENCOUNTER — OFFICE VISIT (OUTPATIENT)
Dept: CARDIOLOGY | Facility: CLINIC | Age: 33
End: 2023-03-31
Payer: COMMERCIAL

## 2023-03-31 VITALS
DIASTOLIC BLOOD PRESSURE: 75 MMHG | WEIGHT: 146 LBS | HEART RATE: 76 BPM | SYSTOLIC BLOOD PRESSURE: 110 MMHG | HEIGHT: 62 IN | BODY MASS INDEX: 26.87 KG/M2

## 2023-03-31 DIAGNOSIS — R00.2 PALPITATIONS: Primary | ICD-10-CM

## 2023-03-31 PROCEDURE — 93000 ELECTROCARDIOGRAM COMPLETE: CPT | Performed by: INTERNAL MEDICINE

## 2023-03-31 PROCEDURE — 99203 OFFICE O/P NEW LOW 30 MIN: CPT | Performed by: INTERNAL MEDICINE

## 2023-03-31 NOTE — PROGRESS NOTES
"Chief Complaint  PVC (Premature ventricular contraction) and Palpitations    Subjective        Brittani Rosas presents to [facility]  History of present illness:    Patient is a 32-year-old female with no significant past medical history who presents with palpitations.  She states when she was pregnant 6 years ago she was diagnosed with PVCs.  They apparently went away for a long period of time.  She states now she feels a rare \"flicking\" sensation.  She can feel it up in her throat and it does take her breath for a couple seconds.  She states it happened 2-3 times last month.  She notes it more when she is lying down but also occasionally when she is up doing something.  She denies any previous tobacco or alcohol.  She notes no exertional chest pain.  Mother and father have no heart disease.        Past Medical History:   Diagnosis Date   • Acne    • Allergies    • Anemia    • Anxiety    • Depression    • Facial aging    • History of cold sores          Past Surgical History:   Procedure Laterality Date   • [place] TOOTH EXTRACTION  2007          Social History     Socioeconomic History   • Marital status:    Tobacco Use   • Smoking status: Never   • Smokeless tobacco: Never   Vaping Use   • Vaping Use: Never used   Substance and Sexual Activity   • Alcohol use: Yes     Comment: LIGHT   • Drug use: Never   • Sexual activity: Yes     Partners: Male     Birth control/protection: None         Family History   Problem Relation Age of Onset   • Endometriosis Mother    • Heart disease Other    • Uterine cancer Neg Hx    • Breast cancer Neg Hx    • Ovarian cancer Neg Hx    • Colon cancer Neg Hx           Allergies   Allergen Reactions   • Shellfish Allergy Unknown - High Severity   • Sulfa Antibiotics Unknown - High Severity          No current outpatient medications on file.      ROS:  Cardiac review of systems positive for palpitations.    Objective     /75   Pulse 76   Ht " "157.5 cm (62\")   Wt 66.2 kg (146 lb)   BMI 26.70 kg/m²       General Appearance:   · well developed  · well nourished  HENT:   · oropharynx moist  · lips not cyanotic  Respiratory:  · no respiratory distress  · normal breath sounds  · no rales  Cardiovascular:  · no jugular venous distention  · regular rhythm  · S1 normal, S2 normal  · no S3, no S4   · no murmur  · no rub, no thrill  · No carotid bruit  · pedal pulses normal  · lower extremity edema: none    Musculoskeletal:  · no clubbing of fingers.   · normocephalic, head atraumatic  Skin:   · warm, dry  Psychiatric:  · judgement and insight appropriate  · normal mood and affect    ECHO:    STRESS:    CATH:  No results found for this or any previous visit.    BMP:   Glucose   Date Value Ref Range Status   07/27/2022 88 65 - 99 mg/dL Final     BUN   Date Value Ref Range Status   07/27/2022 7 6 - 20 mg/dL Final     Creatinine   Date Value Ref Range Status   07/27/2022 0.71 0.57 - 1.00 mg/dL Final     Sodium   Date Value Ref Range Status   07/27/2022 139 136 - 145 mmol/L Final     Potassium   Date Value Ref Range Status   07/27/2022 4.5 3.5 - 5.2 mmol/L Final     Chloride   Date Value Ref Range Status   07/27/2022 104 98 - 107 mmol/L Final     CO2   Date Value Ref Range Status   07/27/2022 25.9 22.0 - 29.0 mmol/L Final     Calcium   Date Value Ref Range Status   07/27/2022 9.1 8.6 - 10.5 mg/dL Final     BUN/Creatinine Ratio   Date Value Ref Range Status   07/27/2022 9.9 7.0 - 25.0 Final     Anion Gap   Date Value Ref Range Status   07/27/2022 9.1 5.0 - 15.0 mmol/L Final     eGFR   Date Value Ref Range Status   07/27/2022 114.6 >60.0 mL/min/1.73 Final     Comment:     National Kidney Foundation and American Society of Nephrology (ASN) Task Force recommended calculation based on the Chronic Kidney Disease Epidemiology Collaboration (CKD-EPI) equation refit without adjustment for race.     LIPIDS:  Total Cholesterol   Date Value Ref Range Status   07/27/2022 155 0 - " 200 mg/dL Final     Triglycerides   Date Value Ref Range Status   07/27/2022 74 0 - 150 mg/dL Final     HDL Cholesterol   Date Value Ref Range Status   07/27/2022 53 40 - 60 mg/dL Final     LDL Cholesterol    Date Value Ref Range Status   07/27/2022 88 0 - 100 mg/dL Final     VLDL Cholesterol   Date Value Ref Range Status   07/27/2022 14 5 - 40 mg/dL Final     LDL/HDL Ratio   Date Value Ref Range Status   07/27/2022 1.65  Final           ECG 12 Lead    Date/Time: 3/31/2023 3:05 PM  Performed by: Anoop Marion MD  Authorized by: Anoop Marion MD   Previous ECG: no previous ECG available  Rhythm: sinus rhythm                     ASSESSMENT:  Diagnoses and all orders for this visit:    1. Palpitations (Primary)         PLAN:    1.  Patient describes what sounds like either PACs or PVCs as she feels it up in her throat and it takes her breath.  It lasts for just a couple seconds.  I told her I would have to have a monitor on her at the time it occurred to know 100% sure.  They come very rarely just 2-3 times last month.  I did tell her no matter what it is it is not going to be dangerous.  I did give her the option of wearing a 30-day event recorder but we have decided just if it became more frequent for her to call us and we could place it at that time.  2.  Blood pressures under good control.  3.  Reviewed patient's labs from 7/27/2022 with normal thyroid, potassium and hemoglobin.  Her LDL was 88, HDL was 53, and triglycerides 74.  These are under excellent control.  4.  Patient does limit her caffeine and drink some water.  5.  Talked to her about a regular exercise program 30 minutes 5 days a week.  6.  I do think think we can see the patient back as needed.  Again if her palpitations become more frequent she will give us a call.  7.  Patient's EKG shows normal sinus rhythm with no abnormalities.      Return if symptoms worsen or fail to improve.     Patient was given instructions and counseling  regarding her condition or for health maintenance advice. Please see specific information pulled into the AVS if appropriate.         Anoop Marion MD   3/31/2023  15:02 EDT

## 2023-06-06 ENCOUNTER — TELEPHONE (OUTPATIENT)
Dept: OBSTETRICS AND GYNECOLOGY | Facility: CLINIC | Age: 33
End: 2023-06-06

## 2023-06-06 NOTE — TELEPHONE ENCOUNTER
Provider: MINESH SNEED  Caller: SYEDAMALISSA TRESA   Phone Number: [phone]  Reason for Call: SAME DAY CANCELLATION//PATIENT SANTIAGO WHITAKER HAD SPOKEN TO MINESH SNEED AND WAS CHANGING THE APPOINTMENT TO TOMORROW AT 3:00//THAT TIME WAS AVAILABLE AND WE RESCHEDULED

## 2023-07-25 ENCOUNTER — LAB (OUTPATIENT)
Dept: OBSTETRICS AND GYNECOLOGY | Facility: CLINIC | Age: 33
End: 2023-07-25
Payer: COMMERCIAL

## 2023-07-25 DIAGNOSIS — Z32.01 POSITIVE URINE PREGNANCY TEST: ICD-10-CM

## 2023-07-25 DIAGNOSIS — Z32.01 POSITIVE URINE PREGNANCY TEST: Primary | ICD-10-CM

## 2023-07-25 PROCEDURE — 84702 CHORIONIC GONADOTROPIN TEST: CPT | Performed by: OBSTETRICS & GYNECOLOGY

## 2023-07-25 PROCEDURE — 36415 COLL VENOUS BLD VENIPUNCTURE: CPT | Performed by: OBSTETRICS & GYNECOLOGY

## 2023-07-26 ENCOUNTER — HOSPITAL ENCOUNTER (OUTPATIENT)
Dept: ULTRASOUND IMAGING | Facility: HOSPITAL | Age: 33
Discharge: HOME | End: 2023-07-26
Admitting: OBSTETRICS & GYNECOLOGY
Payer: COMMERCIAL

## 2023-07-26 DIAGNOSIS — Z32.01 POSITIVE PREGNANCY TEST: ICD-10-CM

## 2023-07-26 DIAGNOSIS — Z32.01 POSITIVE PREGNANCY TEST: Primary | ICD-10-CM

## 2023-07-26 DIAGNOSIS — N92.0 SPOTTING: ICD-10-CM

## 2023-07-26 LAB — HCG INTACT+B SERPL-ACNC: 59.2 MIU/ML

## 2023-07-26 PROCEDURE — 76817 TRANSVAGINAL US OBSTETRIC: CPT

## 2023-07-27 ENCOUNTER — LAB (OUTPATIENT)
Dept: OBSTETRICS AND GYNECOLOGY | Facility: CLINIC | Age: 33
End: 2023-07-27
Payer: COMMERCIAL

## 2023-07-27 DIAGNOSIS — Z32.01 POSITIVE PREGNANCY TEST: ICD-10-CM

## 2023-07-27 DIAGNOSIS — N92.0 SPOTTING: ICD-10-CM

## 2023-07-27 LAB — HCG INTACT+B SERPL-ACNC: 73.42 MIU/ML

## 2023-07-27 PROCEDURE — 84702 CHORIONIC GONADOTROPIN TEST: CPT | Performed by: OBSTETRICS & GYNECOLOGY

## 2023-07-28 ENCOUNTER — TELEPHONE (OUTPATIENT)
Dept: OBSTETRICS AND GYNECOLOGY | Facility: CLINIC | Age: 33
End: 2023-07-28
Payer: COMMERCIAL

## 2023-07-28 DIAGNOSIS — O36.80X0 PREGNANCY WITH INCONCLUSIVE FETAL VIABILITY, SINGLE OR UNSPECIFIED FETUS: Primary | ICD-10-CM

## 2023-07-28 NOTE — TELEPHONE ENCOUNTER
Discussed with patient that an order for repeat Beta HCG was placed and she needs to have that drawn Monday morning at the hospital before her appointment with . Patient voices understanding.

## 2023-07-28 NOTE — TELEPHONE ENCOUNTER
----- Message from MINESH Wise sent at 7/28/2023  8:21 AM EDT -----  Stefano - please let patient know her beta HCG is 73.42 which is a lower increase than expected.  Recommend a follow up with one of the physicians today or Monday.  Please review bleeding and pain precautions.    Rosemary - would you please schedule patient?  Thanks

## 2023-07-28 NOTE — TELEPHONE ENCOUNTER
Please let patient know I have placed an order for a repeat Beta HCG.  Needs to have drawn at [facility] Monday morning before her appointment with Dr. Fregoso.

## 2023-07-28 NOTE — TELEPHONE ENCOUNTER
Informed patient that  her beta HCG is 73.42 which is a lower increase than expected.  We also discussed a follow up with one of the physicians today or Monday is what is recommended. Reviewed with patient that if she has any heavy bleeding or pain she needs to go to ED. Informed patient that Rosemary would be calling her with a scheduled appointment.

## 2023-07-31 ENCOUNTER — OFFICE VISIT (OUTPATIENT)
Dept: OBSTETRICS AND GYNECOLOGY | Facility: CLINIC | Age: 33
End: 2023-07-31
Payer: COMMERCIAL

## 2023-07-31 ENCOUNTER — TELEPHONE (OUTPATIENT)
Dept: OBSTETRICS AND GYNECOLOGY | Facility: CLINIC | Age: 33
End: 2023-07-31
Payer: COMMERCIAL

## 2023-07-31 ENCOUNTER — LAB (OUTPATIENT)
Dept: LAB | Facility: HOSPITAL | Age: 33
End: 2023-07-31
Payer: COMMERCIAL

## 2023-07-31 VITALS — WEIGHT: 141 LBS | DIASTOLIC BLOOD PRESSURE: 74 MMHG | BODY MASS INDEX: 25.79 KG/M2 | SYSTOLIC BLOOD PRESSURE: 118 MMHG

## 2023-07-31 DIAGNOSIS — O36.80X0 PREGNANCY WITH INCONCLUSIVE FETAL VIABILITY, SINGLE OR UNSPECIFIED FETUS: Primary | ICD-10-CM

## 2023-07-31 DIAGNOSIS — O02.0 ANEMBRYONIC PREGNANCY: Primary | ICD-10-CM

## 2023-07-31 DIAGNOSIS — O36.80X0 PREGNANCY WITH INCONCLUSIVE FETAL VIABILITY, SINGLE OR UNSPECIFIED FETUS: ICD-10-CM

## 2023-07-31 LAB
ALBUMIN SERPL-MCNC: 4.6 G/DL (ref 3.5–5.2)
ALBUMIN/GLOB SERPL: 1.8 G/DL
ALP SERPL-CCNC: 68 U/L (ref 39–117)
ALT SERPL W P-5'-P-CCNC: 11 U/L (ref 1–33)
ANION GAP SERPL CALCULATED.3IONS-SCNC: 11.3 MMOL/L (ref 5–15)
AST SERPL-CCNC: 13 U/L (ref 1–32)
BILIRUB SERPL-MCNC: 0.8 MG/DL (ref 0–1.2)
BUN SERPL-MCNC: 6 MG/DL (ref 6–20)
BUN/CREAT SERPL: 8.7 (ref 7–25)
CALCIUM SPEC-SCNC: 9.1 MG/DL (ref 8.6–10.5)
CHLORIDE SERPL-SCNC: 106 MMOL/L (ref 98–107)
CO2 SERPL-SCNC: 22.7 MMOL/L (ref 22–29)
CREAT SERPL-MCNC: 0.69 MG/DL (ref 0.57–1)
DEPRECATED RDW RBC AUTO: 42.4 FL (ref 37–54)
EGFRCR SERPLBLD CKD-EPI 2021: 116.2 ML/MIN/1.73
ERYTHROCYTE [DISTWIDTH] IN BLOOD BY AUTOMATED COUNT: 13.7 % (ref 12.3–15.4)
GLOBULIN UR ELPH-MCNC: 2.6 GM/DL
GLUCOSE SERPL-MCNC: 101 MG/DL (ref 65–99)
HCG INTACT+B SERPL-ACNC: 96.54 MIU/ML
HCT VFR BLD AUTO: 38.5 % (ref 34–46.6)
HGB BLD-MCNC: 13.2 G/DL (ref 12–15.9)
MCH RBC QN AUTO: 28.9 PG (ref 26.6–33)
MCHC RBC AUTO-ENTMCNC: 34.3 G/DL (ref 31.5–35.7)
MCV RBC AUTO: 84.4 FL (ref 79–97)
PLATELET # BLD AUTO: 335 10*3/MM3 (ref 140–450)
PMV BLD AUTO: 9 FL (ref 6–12)
POTASSIUM SERPL-SCNC: 4.1 MMOL/L (ref 3.5–5.2)
PROT SERPL-MCNC: 7.2 G/DL (ref 6–8.5)
RBC # BLD AUTO: 4.56 10*6/MM3 (ref 3.77–5.28)
SODIUM SERPL-SCNC: 140 MMOL/L (ref 136–145)
WBC NRBC COR # BLD: 7.52 10*3/MM3 (ref 3.4–10.8)

## 2023-07-31 PROCEDURE — 85027 COMPLETE CBC AUTOMATED: CPT

## 2023-07-31 PROCEDURE — 80053 COMPREHEN METABOLIC PANEL: CPT

## 2023-07-31 PROCEDURE — 36415 COLL VENOUS BLD VENIPUNCTURE: CPT

## 2023-07-31 PROCEDURE — 84702 CHORIONIC GONADOTROPIN TEST: CPT

## 2023-08-03 ENCOUNTER — LAB (OUTPATIENT)
Dept: LAB | Facility: HOSPITAL | Age: 33
End: 2023-08-03
Payer: COMMERCIAL

## 2023-08-03 DIAGNOSIS — O02.0 ANEMBRYONIC PREGNANCY: ICD-10-CM

## 2023-08-03 LAB — HCG INTACT+B SERPL-ACNC: 109.6 MIU/ML

## 2023-08-03 PROCEDURE — 84702 CHORIONIC GONADOTROPIN TEST: CPT

## 2023-08-03 PROCEDURE — 36415 COLL VENOUS BLD VENIPUNCTURE: CPT

## 2023-08-04 ENCOUNTER — OFFICE VISIT (OUTPATIENT)
Dept: OBSTETRICS AND GYNECOLOGY | Facility: CLINIC | Age: 33
End: 2023-08-04
Payer: COMMERCIAL

## 2023-08-04 VITALS — SYSTOLIC BLOOD PRESSURE: 131 MMHG | BODY MASS INDEX: 25.79 KG/M2 | WEIGHT: 141 LBS | DIASTOLIC BLOOD PRESSURE: 83 MMHG

## 2023-08-04 DIAGNOSIS — Z32.01 POSITIVE PREGNANCY TEST: Primary | ICD-10-CM

## 2023-08-07 NOTE — PROGRESS NOTES
GYN Visit    Chief Complaint   Patient presents with    FUUS SAB       HPI:   33 y.o. LMP: No LMP recorded (lmp unknown). Patient is pregnant.     Social History     Substance and Sexual Activity   Sexual Activity Yes    Partners: Male    Birth control/protection: None   hCG, Quantitative, Pregnancy (2023 06:15)    Progress Notes by Margaret Fregoso DO (2023 09:15)    All weekend heavy VB w passing tissue and midline cramps and LBP cramps.  Now bleeding is very light.  Coping ok, has good support.     US today wnl, small fluid collection on EMS on last US is now gone.  EMS 4mm.  BHCG has dropped from 109 to 9.     History: PMHx, Meds, Allergies, PSHx, Social Hx, and POBHx all reviewed and updated.    PHYSICAL EXAM:  /76   Pulse 62   Wt 64 kg (141 lb 3.2 oz)   LMP  (LMP Unknown) Comment: End of 2023  Breastfeeding No   BMI 25.83 kg/mý   General- NAD, alert and oriented, appropriate  Psych- Normal mood, good memory    ASSESSMENT AND PLAN:  Diagnoses and all orders for this visit:    1. Chemical pregnancy (Primary)  Comments:  Resolved    We discussed potential etiologies of pregnancy loss and the difference between a spontaneous miscarriage and anembryonic/chemical pregnancy.  We discussed waiting 1 month with a normal menstrual cycle before trying to conceive again.  We also discussed options of preconceptual testing with cystic fibrosis and SMA.  Patient declines.  Continue healthy lifestyle prenatal vitamins.      Follow Up:  Return next positive home preg test.          Margaret Fregoso DO  2023    [facility]  [address] [place] [address]  Dept: [phone]  Dept Fax: [phone]  Loc: [phone]  Loc Fax: [phone]

## 2023-08-08 ENCOUNTER — OFFICE VISIT (OUTPATIENT)
Dept: OBSTETRICS AND GYNECOLOGY | Facility: CLINIC | Age: 33
End: 2023-08-08
Payer: COMMERCIAL

## 2023-08-08 ENCOUNTER — LAB (OUTPATIENT)
Dept: LAB | Facility: HOSPITAL | Age: 33
End: 2023-08-08
Payer: COMMERCIAL

## 2023-08-08 VITALS
SYSTOLIC BLOOD PRESSURE: 113 MMHG | WEIGHT: 141.2 LBS | DIASTOLIC BLOOD PRESSURE: 76 MMHG | BODY MASS INDEX: 25.83 KG/M2 | HEART RATE: 62 BPM

## 2023-08-08 DIAGNOSIS — Z32.01 POSITIVE PREGNANCY TEST: ICD-10-CM

## 2023-08-08 DIAGNOSIS — O02.81 CHEMICAL PREGNANCY: Primary | ICD-10-CM

## 2023-08-08 LAB — HCG INTACT+B SERPL-ACNC: 9.26 MIU/ML

## 2023-08-08 PROCEDURE — 84702 CHORIONIC GONADOTROPIN TEST: CPT

## 2023-08-08 PROCEDURE — 36415 COLL VENOUS BLD VENIPUNCTURE: CPT

## 2023-08-30 ENCOUNTER — OFFICE VISIT (OUTPATIENT)
Dept: OBSTETRICS AND GYNECOLOGY | Facility: CLINIC | Age: 33
End: 2023-08-30
Payer: COMMERCIAL

## 2023-08-30 VITALS
WEIGHT: 146 LBS | SYSTOLIC BLOOD PRESSURE: 136 MMHG | BODY MASS INDEX: 26.87 KG/M2 | DIASTOLIC BLOOD PRESSURE: 80 MMHG | HEIGHT: 62 IN | HEART RATE: 73 BPM

## 2023-08-30 DIAGNOSIS — Z01.419 ENCOUNTER FOR GYNECOLOGICAL EXAMINATION WITHOUT ABNORMAL FINDING: Primary | ICD-10-CM

## 2023-08-30 NOTE — PROGRESS NOTES
"Well Woman Visit    CC: Annual well woman exam       HPI:   33 y.o. Contraception or HRT: Contraception:  None  Menses:  recent miscarriage, spotting currently  Pain:  None  Incontinence concerns: No  Hx of abnormal pap:  No  Pt has no complaints today.      History: PMHx, Meds, Allergies, PSHx, Social Hx, and POBHx all reviewed and updated.      PHYSICAL EXAM:  /80   Pulse 73   Ht 157.5 cm (62\")   Wt 66.2 kg (146 lb)   LMP  (LMP Unknown) Comment: End of 2023  Breastfeeding No   BMI 26.70 kg/mý   General- NAD, alert and oriented, appropriate  Psych- Normal mood, good memory  Neck- No masses, no thyroid enlargement  CV- Regular rhythm, no murnurs  Resp- CTA to bases, no wheezes  Abdomen- Soft, non distended, non tender, no masses    Breast left-  Bilaterally symmetrical, no masses, non tender, no nipple discharge  Breast right- Bilaterally symmetrical, no masses, non tender, no nipple discharge    External genitalia- Normal female, no lesions  Urethra/meatus- Normal, no masses, non tender, no prolapse  Bladder- Normal, no masses, non tender, no prolapse  Vagina- Normal, no atrophy, no lesions, no discharge, no prolapse  Cvx- Normal, no lesions, no discharge, No cervical motion tenderness  Uterus- Normal size, shape & consistency.  Non tender, mobile, & no prolapse  Adnexa- No mass, non tender  Anus/Rectum/Perineum- Not performed    Lymphatic- No palpable neck, axillary, or groin nodes  Ext- No edema, no cyanosis    Skin- No lesions, no rashes, no acanthosis nigricans        ASSESSMENT and PLAN:  WWE    Diagnoses and all orders for this visit:    1. Encounter for gynecological examination without abnormal finding (Primary)  -     IgP, Aptima HPV    Declines bc. States the goal is still pregnancy.    Counseling:     Track menses, RTO IF <q21d, >7d long, or heavy  PNV    Domestic violence/abuse screen: negative    Depression screen: no SI    Preventative:   BREAST HEALTH- Monthly self breast exam " importance and how to reviewed. MMG and/or MRI (prn) reviewed per society guidelines and her individual history. Mammo/MRI screen: Not medically needed.  CERVICAL CANCER Screening- Reviewed current ASCCP guidelines for screening w and wo cotest HPV, age specific.  Screen: Updated today.  COLON CANCER Screening- Reviewed current medical society guidelines and options.  Colonoscopy screen:  Not medically needed.  SEXUAL HEALTH: Declines STD screening.  VACCINATIONS Recommended: Flu annually, Gardisil/HPV vaccine (up to 46yo).  Importance discussed, risk being unvaccinated reviewed.  Questions answered  Smoking status- NON SMOKER.  Importance of avoiding second hand smoke.  Follow up PCP/Specialist PMHx and Labs  Myriad: Counseled and evaluated for hereditary cancer testing. Testing declined.  Gardasil status: unsure if received.      She understands the importance of having any ordered tests to be performed in a timely fashion.  She is encouraged to review her results online and/or contact or office if she has questions.     Follow Up:  Return if symptoms worsen or fail to improve.      Lorena Tobias, APRN  08/30/2023

## 2023-09-05 LAB
CYTOLOGIST CVX/VAG CYTO: NORMAL
CYTOLOGY CVX/VAG DOC CYTO: NORMAL
CYTOLOGY CVX/VAG DOC THIN PREP: NORMAL
DX ICD CODE: NORMAL
HIV 1 & 2 AB SER-IMP: NORMAL
HPV I/H RISK 4 DNA CVX QL PROBE+SIG AMP: NEGATIVE
Lab: NORMAL
OTHER STN SPEC: NORMAL
STAT OF ADQ CVX/VAG CYTO-IMP: NORMAL

## 2023-12-28 RX ORDER — FLUCONAZOLE 150 MG/1
150 TABLET ORAL
Qty: 2 TABLET | Refills: 0 | Status: SHIPPED | OUTPATIENT
Start: 2023-12-28 | End: 2024-01-03

## 2024-01-02 ENCOUNTER — OFFICE VISIT (OUTPATIENT)
Dept: INTERNAL MEDICINE | Facility: CLINIC | Age: 34
End: 2024-01-02
Payer: COMMERCIAL

## 2024-01-02 VITALS
DIASTOLIC BLOOD PRESSURE: 62 MMHG | HEART RATE: 81 BPM | BODY MASS INDEX: 27.42 KG/M2 | TEMPERATURE: 98.3 F | SYSTOLIC BLOOD PRESSURE: 114 MMHG | RESPIRATION RATE: 18 BRPM | WEIGHT: 149 LBS | HEIGHT: 62 IN | OXYGEN SATURATION: 98 %

## 2024-01-02 DIAGNOSIS — R05.1 ACUTE COUGH: Primary | ICD-10-CM

## 2024-01-02 DIAGNOSIS — J06.9 ACUTE URI: ICD-10-CM

## 2024-01-02 PROCEDURE — 99213 OFFICE O/P EST LOW 20 MIN: CPT | Performed by: NURSE PRACTITIONER

## 2024-01-02 NOTE — PROGRESS NOTES
"Chief Complaint  Cough (3/4 weeks- chest and back pain with cough- Seen at [facility] 12/19/23 prescribed Amoxicillin which did not finish ), Fatigue (When walking around), and Shortness of Breath (Trouble breathing due to mucus- choked and had to vomit- Friday Night)    Subjective          Brittani Rosas presents to [facility]  History of Present Illness  Patient was seen on 12/19 at [facility] for cough after 1.5 wks. She was given amox without improvement. She reports feeling fatigue. She is having some soa with productive cough. She reports having vomiting about 4-5 days ago. She reports feeling dizzy and having a choking episode  followed by vomiting and diarrhea. She reports cough is now wet. She reports feeling fatigue and has not been eating well.  Denies fever, chills    Objective   Vital Signs:   /62 (BP Location: Left arm, Patient Position: Sitting, Cuff Size: Adult)   Pulse 81   Temp 98.3 °F (36.8 °C)   Resp 18   Ht 157.5 cm (62\")   Wt 67.6 kg (149 lb)   SpO2 98%   BMI 27.25 kg/m²     Physical Exam  Vitals and nursing note reviewed.   Constitutional:       General: She is not in acute distress.     Appearance: Normal appearance.   HENT:      Head: Normocephalic and atraumatic.      Right Ear: Tympanic membrane, ear canal and external ear normal.      Left Ear: Tympanic membrane, ear canal and external ear normal.      Nose: Nose normal.      Mouth/Throat:      Mouth: Mucous membranes are moist.      Pharynx: Posterior oropharyngeal erythema present.   Eyes:      Conjunctiva/sclera: Conjunctivae normal.   Cardiovascular:      Rate and Rhythm: Normal rate and regular rhythm.      Pulses: Normal pulses.      Heart sounds: Normal heart sounds. No murmur heard.     No friction rub. No gallop.   Pulmonary:      Effort: Pulmonary effort is normal. No respiratory distress.      Breath sounds: No wheezing, rhonchi or rales.   Musculoskeletal:    "   Cervical back: Neck supple.   Lymphadenopathy:      Cervical: No cervical adenopathy.   Skin:     General: Skin is warm and dry.   Neurological:      General: No focal deficit present.      Mental Status: She is alert and oriented to person, place, and time.   Psychiatric:         Mood and Affect: Mood normal.         Behavior: Behavior normal.        Result Review :          Procedures      Assessment and Plan    Diagnoses and all orders for this visit:    1. Acute cough (Primary)  -     XR Chest PA & Lateral (In Office)    2. Acute URI    Discussed likely suspicion for initial upper respiratory infection with lingering postviral cough.  Discussed possible observation of symptoms with recent choking episode followed by vomiting.  Chest x-ray in the office today is unremarkable.  Assuring lung exam.  She will continue with omeprazole 20 mg twice daily for the next few weeks.  Also discussed taking Tessalon Perles as needed.  She will call with any worsening symptoms or if symptoms or not improving over the next 2 weeks.          Follow Up   No follow-ups on file.  Patient was given instructions and counseling regarding her condition or for health maintenance advice. Please see specific information pulled into the AVS if appropriate.

## 2024-04-18 ENCOUNTER — DOCUMENTATION (OUTPATIENT)
Dept: GASTROENTEROLOGY | Facility: CLINIC | Age: 34
End: 2024-04-18
Payer: COMMERCIAL

## 2024-04-18 RX ORDER — FLUCONAZOLE 150 MG/1
150 TABLET ORAL
Qty: 3 TABLET | Refills: 1 | Status: SHIPPED | OUTPATIENT
Start: 2024-04-18

## 2024-05-08 ENCOUNTER — LAB (OUTPATIENT)
Dept: LAB | Facility: HOSPITAL | Age: 34
End: 2024-05-08
Payer: COMMERCIAL

## 2024-05-08 DIAGNOSIS — Z32.01 POSITIVE URINE PREGNANCY TEST: Primary | ICD-10-CM

## 2024-05-08 DIAGNOSIS — Z32.01 POSITIVE URINE PREGNANCY TEST: ICD-10-CM

## 2024-05-08 LAB — HCG INTACT+B SERPL-ACNC: 36.07 MIU/ML

## 2024-05-08 PROCEDURE — 84702 CHORIONIC GONADOTROPIN TEST: CPT

## 2024-05-08 PROCEDURE — 36415 COLL VENOUS BLD VENIPUNCTURE: CPT

## 2024-05-13 ENCOUNTER — LAB (OUTPATIENT)
Dept: LAB | Facility: HOSPITAL | Age: 34
End: 2024-05-13
Payer: COMMERCIAL

## 2024-05-13 DIAGNOSIS — O36.80X0 PREGNANCY WITH INCONCLUSIVE FETAL VIABILITY, SINGLE OR UNSPECIFIED FETUS: Primary | ICD-10-CM

## 2024-05-13 DIAGNOSIS — O36.80X0 PREGNANCY WITH INCONCLUSIVE FETAL VIABILITY, SINGLE OR UNSPECIFIED FETUS: ICD-10-CM

## 2024-05-13 LAB — HCG INTACT+B SERPL-ACNC: 494.2 MIU/ML

## 2024-05-13 PROCEDURE — 84702 CHORIONIC GONADOTROPIN TEST: CPT

## 2024-05-13 PROCEDURE — 36415 COLL VENOUS BLD VENIPUNCTURE: CPT

## 2024-06-04 ENCOUNTER — OFFICE VISIT (OUTPATIENT)
Dept: OBSTETRICS AND GYNECOLOGY | Facility: CLINIC | Age: 34
End: 2024-06-04
Payer: COMMERCIAL

## 2024-06-04 VITALS
HEIGHT: 62 IN | SYSTOLIC BLOOD PRESSURE: 114 MMHG | WEIGHT: 147 LBS | BODY MASS INDEX: 27.05 KG/M2 | DIASTOLIC BLOOD PRESSURE: 78 MMHG

## 2024-06-04 DIAGNOSIS — O36.80X0 PREGNANCY WITH INCONCLUSIVE FETAL VIABILITY, SINGLE OR UNSPECIFIED FETUS: Primary | ICD-10-CM

## 2024-06-04 DIAGNOSIS — O21.9 NAUSEA AND VOMITING IN PREGNANCY: ICD-10-CM

## 2024-06-04 PROBLEM — O09.529 ANTEPARTUM MULTIGRAVIDA OF ADVANCED MATERNAL AGE: Status: ACTIVE | Noted: 2024-06-04

## 2024-06-04 PROBLEM — Z34.80 SUPERVISION OF OTHER NORMAL PREGNANCY, ANTEPARTUM: Status: ACTIVE | Noted: 2024-06-04

## 2024-06-04 PROCEDURE — 99212 OFFICE O/P EST SF 10 MIN: CPT | Performed by: OBSTETRICS & GYNECOLOGY

## 2024-06-04 RX ORDER — DIPHENHYDRAMINE HYDROCHLORIDE 25 MG/1
25 CAPSULE ORAL 2 TIMES DAILY PRN
Qty: 60 TABLET | Refills: 1 | Status: SHIPPED | OUTPATIENT
Start: 2024-06-04

## 2024-06-04 RX ORDER — ONDANSETRON 4 MG/1
4 TABLET, FILM COATED ORAL DAILY PRN
Qty: 30 TABLET | Refills: 1 | Status: SHIPPED | OUTPATIENT
Start: 2024-06-04 | End: 2025-06-04

## 2024-06-04 RX ORDER — PRENATAL VIT/IRON FUM/FOLIC AC 27MG-0.8MG
TABLET ORAL DAILY
COMMUNITY

## 2024-06-04 NOTE — PROGRESS NOTES
Us only visit, see media for results. Pt to follow up in 2 weeks for new OB visit.     Electronically signed by:    Kimberly See DO  06/04/24  08:30 EDT

## 2024-06-11 ENCOUNTER — TELEPHONE (OUTPATIENT)
Dept: OBSTETRICS AND GYNECOLOGY | Facility: CLINIC | Age: 34
End: 2024-06-11
Payer: COMMERCIAL

## 2024-06-11 ENCOUNTER — LAB (OUTPATIENT)
Dept: LAB | Facility: HOSPITAL | Age: 34
End: 2024-06-11
Payer: COMMERCIAL

## 2024-06-11 ENCOUNTER — OFFICE VISIT (OUTPATIENT)
Dept: INTERNAL MEDICINE | Facility: CLINIC | Age: 34
End: 2024-06-11
Payer: COMMERCIAL

## 2024-06-11 VITALS
HEART RATE: 79 BPM | OXYGEN SATURATION: 98 % | RESPIRATION RATE: 18 BRPM | SYSTOLIC BLOOD PRESSURE: 102 MMHG | BODY MASS INDEX: 27.02 KG/M2 | WEIGHT: 146.8 LBS | DIASTOLIC BLOOD PRESSURE: 62 MMHG | HEIGHT: 62 IN | TEMPERATURE: 97.8 F

## 2024-06-11 DIAGNOSIS — D72.829 LEUKOCYTOSIS, UNSPECIFIED TYPE: Primary | ICD-10-CM

## 2024-06-11 DIAGNOSIS — R53.81 MALAISE AND FATIGUE: ICD-10-CM

## 2024-06-11 DIAGNOSIS — R53.83 MALAISE AND FATIGUE: Primary | ICD-10-CM

## 2024-06-11 DIAGNOSIS — R53.83 MALAISE AND FATIGUE: ICD-10-CM

## 2024-06-11 DIAGNOSIS — J06.9 ACUTE URI: ICD-10-CM

## 2024-06-11 DIAGNOSIS — R53.81 MALAISE AND FATIGUE: Primary | ICD-10-CM

## 2024-06-11 DIAGNOSIS — R19.7 DIARRHEA, UNSPECIFIED TYPE: ICD-10-CM

## 2024-06-11 DIAGNOSIS — O21.9 NAUSEA AND VOMITING IN PREGNANCY: Primary | ICD-10-CM

## 2024-06-11 LAB
027 TOXIN: NORMAL
ALBUMIN SERPL-MCNC: 4 G/DL (ref 3.5–5.2)
ALBUMIN/GLOB SERPL: 1.5 G/DL
ALP SERPL-CCNC: 64 U/L (ref 39–117)
ALT SERPL W P-5'-P-CCNC: 28 U/L (ref 1–33)
ANION GAP SERPL CALCULATED.3IONS-SCNC: 10 MMOL/L (ref 5–15)
AST SERPL-CCNC: 18 U/L (ref 1–32)
BASOPHILS # BLD AUTO: 0.05 10*3/MM3 (ref 0–0.2)
BASOPHILS NFR BLD AUTO: 0.3 % (ref 0–1.5)
BILIRUB SERPL-MCNC: 0.4 MG/DL (ref 0–1.2)
BUN SERPL-MCNC: 4 MG/DL (ref 6–20)
BUN/CREAT SERPL: 7.7 (ref 7–25)
C DIFF TOX GENS STL QL NAA+PROBE: NEGATIVE
CALCIUM SPEC-SCNC: 8.9 MG/DL (ref 8.6–10.5)
CHLORIDE SERPL-SCNC: 103 MMOL/L (ref 98–107)
CO2 SERPL-SCNC: 22 MMOL/L (ref 22–29)
CREAT SERPL-MCNC: 0.52 MG/DL (ref 0.57–1)
DEPRECATED RDW RBC AUTO: 41.1 FL (ref 37–54)
EGFRCR SERPLBLD CKD-EPI 2021: 124.4 ML/MIN/1.73
EOSINOPHIL # BLD AUTO: 0.08 10*3/MM3 (ref 0–0.4)
EOSINOPHIL NFR BLD AUTO: 0.5 % (ref 0.3–6.2)
ERYTHROCYTE [DISTWIDTH] IN BLOOD BY AUTOMATED COUNT: 13.3 % (ref 12.3–15.4)
GLOBULIN UR ELPH-MCNC: 2.7 GM/DL
GLUCOSE SERPL-MCNC: 87 MG/DL (ref 65–99)
HCT VFR BLD AUTO: 36.3 % (ref 34–46.6)
HGB BLD-MCNC: 12.3 G/DL (ref 12–15.9)
IMM GRANULOCYTES # BLD AUTO: 0.06 10*3/MM3 (ref 0–0.05)
IMM GRANULOCYTES NFR BLD AUTO: 0.4 % (ref 0–0.5)
LACTOFERRIN STL QL LA: POSITIVE
LYMPHOCYTES # BLD AUTO: 2.96 10*3/MM3 (ref 0.7–3.1)
LYMPHOCYTES NFR BLD AUTO: 18.4 % (ref 19.6–45.3)
MCH RBC QN AUTO: 28.5 PG (ref 26.6–33)
MCHC RBC AUTO-ENTMCNC: 33.9 G/DL (ref 31.5–35.7)
MCV RBC AUTO: 84.2 FL (ref 79–97)
MONOCYTES # BLD AUTO: 0.73 10*3/MM3 (ref 0.1–0.9)
MONOCYTES NFR BLD AUTO: 4.5 % (ref 5–12)
NEUTROPHILS NFR BLD AUTO: 12.22 10*3/MM3 (ref 1.7–7)
NEUTROPHILS NFR BLD AUTO: 75.9 % (ref 42.7–76)
NRBC BLD AUTO-RTO: 0 /100 WBC (ref 0–0.2)
PLATELET # BLD AUTO: 322 10*3/MM3 (ref 140–450)
PMV BLD AUTO: 9 FL (ref 6–12)
POTASSIUM SERPL-SCNC: 3.7 MMOL/L (ref 3.5–5.2)
PROT SERPL-MCNC: 6.7 G/DL (ref 6–8.5)
RBC # BLD AUTO: 4.31 10*6/MM3 (ref 3.77–5.28)
SODIUM SERPL-SCNC: 135 MMOL/L (ref 136–145)
WBC NRBC COR # BLD AUTO: 16.1 10*3/MM3 (ref 3.4–10.8)

## 2024-06-11 PROCEDURE — 87506 IADNA-DNA/RNA PROBE TQ 6-11: CPT

## 2024-06-11 PROCEDURE — 99214 OFFICE O/P EST MOD 30 MIN: CPT | Performed by: NURSE PRACTITIONER

## 2024-06-11 PROCEDURE — 87493 C DIFF AMPLIFIED PROBE: CPT

## 2024-06-11 PROCEDURE — 36415 COLL VENOUS BLD VENIPUNCTURE: CPT

## 2024-06-11 PROCEDURE — 80053 COMPREHEN METABOLIC PANEL: CPT

## 2024-06-11 PROCEDURE — 83630 LACTOFERRIN FECAL (QUAL): CPT

## 2024-06-11 PROCEDURE — 85025 COMPLETE CBC W/AUTO DIFF WBC: CPT

## 2024-06-11 RX ORDER — DIPHENOXYLATE HYDROCHLORIDE AND ATROPINE SULFATE 2.5; .025 MG/1; MG/1
1 TABLET ORAL 4 TIMES DAILY PRN
Qty: 12 TABLET | Refills: 0 | Status: SHIPPED | OUTPATIENT
Start: 2024-06-11 | End: 2025-06-11

## 2024-06-11 RX ORDER — PROMETHAZINE HYDROCHLORIDE 25 MG/1
25 TABLET ORAL EVERY 6 HOURS PRN
Qty: 30 TABLET | Refills: 0 | Status: SHIPPED | OUTPATIENT
Start: 2024-06-11

## 2024-06-11 NOTE — TELEPHONE ENCOUNTER
Discussed with patient.  Does desire promethazine and lomotil rx.  Discussed clear liquids then BRAT diet.  Advised patient if feels needs fluids to report to ED.  Patient voices understanding.

## 2024-06-11 NOTE — PROGRESS NOTES
"Chief Complaint  Cough (Cough and then had stomach bug with diarrhea and vomiting. Coughing up green yellow mucus. Dry cough. ), Diarrhea (9 weeks pregnant- WBC abnormal. Diarrhea Continued 2 weeks now), Nasal Congestion, and Earache (Left ear pain- on and off since congestion 4/5 days )    Kam Rosas presents to [facility]  History of Present Illness  Patient here today for evaluation of ongoing cough that started approximately 2 weeks ago.  She reports having a stomach bug over the last 1 week and has had continued diarrhea from this.  She reports some nasal congestion and left earache ongoing in the last 4 to 5 days. She reports that her son had similar symptoms.     She reports that cough has worsened over the last week. She reports cough is mostly dry. She denies feeling soa. Denies fever    She reports nausea that is ongoing. Had vomiting last week for 24 hrs.   Diarrhea is watery.   Denies recent antibiotic use.  Denies abdominal pain/cramping    She is currently 9 weeks pregnant  She had some lab work drawn this morning, WBC 16  Lactoferrin positive in stool today  Cdiff negative  OB has prescribed zofran, phenergan and lomotil  She has tried the Zofran but has not tried the other medications.  Drinking water, Gatorade, and ginger ale  Not eating much solids    Objective   Vital Signs:   /62 (BP Location: Left arm, Patient Position: Sitting, Cuff Size: Adult)   Pulse 79   Temp 97.8 °F (36.6 °C)   Resp 18   Ht 157.5 cm (62\")   Wt 66.6 kg (146 lb 12.8 oz)   SpO2 98%   BMI 26.85 kg/m²     Physical Exam  Vitals and nursing note reviewed.   Constitutional:       General: She is not in acute distress.     Appearance: Normal appearance.   HENT:      Head: Normocephalic and atraumatic.      Right Ear: Tympanic membrane, ear canal and external ear normal.      Left Ear: Tympanic membrane, ear canal and external ear normal.      " Nose: Nose normal. No rhinorrhea.      Mouth/Throat:      Mouth: Mucous membranes are moist.      Pharynx: No posterior oropharyngeal erythema.   Eyes:      Conjunctiva/sclera: Conjunctivae normal.   Cardiovascular:      Rate and Rhythm: Normal rate and regular rhythm.      Pulses: Normal pulses.      Heart sounds: Normal heart sounds. No murmur heard.     No friction rub. No gallop.   Pulmonary:      Effort: Pulmonary effort is normal. No respiratory distress.      Breath sounds: No wheezing, rhonchi or rales.   Abdominal:      General: There is no distension.      Palpations: Abdomen is soft. There is no mass.      Tenderness: There is no abdominal tenderness.   Musculoskeletal:      Cervical back: Neck supple.      Right lower leg: No edema.      Left lower leg: No edema.   Lymphadenopathy:      Cervical: No cervical adenopathy.   Skin:     General: Skin is warm and dry.   Neurological:      General: No focal deficit present.      Mental Status: She is alert and oriented to person, place, and time.   Psychiatric:         Mood and Affect: Mood normal.         Behavior: Behavior normal.        Result Review :          Procedures      Assessment and Plan    Diagnoses and all orders for this visit:    1. Leukocytosis, unspecified type (Primary)  -     CBC Auto Differential; Future  -     Basic Metabolic Panel; Future    2. Acute URI    3. Diarrhea, unspecified type    Discussed suspicion for likely viral upper respiratory infection followed by gastroenteritis.  Reassuring physical exam and expectation that upper respiratory symptoms will continue to improve over the next few days.  Discussed lab work at length and suspicious that white blood cell count of 16 is likely from recent infections.  Monocytes normal.  Expect that the lactoferrin positive stool study may also be from recent gastroenteritis.  C. difficile negative at this time.  Enteric bacteria and parasite panels still pending.  Will recheck white blood  cell count and BMP in 2 days.  Discussed recommendation to hydrate well, bland diet with toast and avoiding dairy over the next several days and advance as tolerated after diarrhea resolves.  Recommended not taking Lomotil given lack of safety information with pregnancy.  Discontinuing Zofran as needed for acute illness.  Also discussed yogurt if tolerable for replacement of good bacteria.  Patient will call with any new or worsening symptoms or with onset of fever.        I spent 30 minutes caring for Brittani on this date of service. This time includes time spent by me in the following activities:preparing for the visit, reviewing tests, performing a medically appropriate examination and/or evaluation , counseling and educating the patient/family/caregiver, ordering medications, tests, or procedures, and documenting information in the medical record  Follow Up   No follow-ups on file.  Patient was given instructions and counseling regarding her condition or for health maintenance advice. Please see specific information pulled into the AVS if appropriate.

## 2024-06-11 NOTE — TELEPHONE ENCOUNTER
I can send lomotil to her pharmacy for the diarrhea if desired.  Is the zofran helping her nausea?  If not, can send a prescription for promethazine.  If feels she is severely dehydrated, would recommend evaluation in the ED for possible IV fluids.

## 2024-06-11 NOTE — TELEPHONE ENCOUNTER
Patient currently 9 weeks preg.  Her child recently had stomach bug with  vomiting/diarrhea.  Patient began with same symptoms 6/6/24.  Vomiting has stopped.  Still with nausea and multiple episodes of diarrhea every day.  Also having coughing w green mucous.  Concerned about the diarrhea lasting this long.  Feels like unable to keep any nutrients.

## 2024-06-12 LAB
C COLI+JEJ+UPSA DNA STL QL NAA+NON-PROBE: NOT DETECTED
CRYPTOSP DNA STL QL NAA+NON-PROBE: NOT DETECTED
E HISTOLYT DNA STL QL NAA+NON-PROBE: NOT DETECTED
EC STX1+STX2 GENES STL QL NAA+NON-PROBE: NOT DETECTED
G LAMBLIA DNA STL QL NAA+NON-PROBE: NOT DETECTED
S ENT+BONG DNA STL QL NAA+NON-PROBE: NOT DETECTED
SHIGELLA SP+EIEC IPAH ST NAA+NON-PROBE: NOT DETECTED

## 2024-06-13 ENCOUNTER — LAB (OUTPATIENT)
Dept: LAB | Facility: HOSPITAL | Age: 34
End: 2024-06-13
Payer: COMMERCIAL

## 2024-06-13 DIAGNOSIS — D72.829 LEUKOCYTOSIS, UNSPECIFIED TYPE: ICD-10-CM

## 2024-06-13 LAB
ANION GAP SERPL CALCULATED.3IONS-SCNC: 12.2 MMOL/L (ref 5–15)
BASOPHILS # BLD AUTO: 0.04 10*3/MM3 (ref 0–0.2)
BASOPHILS NFR BLD AUTO: 0.3 % (ref 0–1.5)
BUN SERPL-MCNC: 4 MG/DL (ref 6–20)
BUN/CREAT SERPL: 7.5 (ref 7–25)
CALCIUM SPEC-SCNC: 9.1 MG/DL (ref 8.6–10.5)
CHLORIDE SERPL-SCNC: 103 MMOL/L (ref 98–107)
CO2 SERPL-SCNC: 22.8 MMOL/L (ref 22–29)
CREAT SERPL-MCNC: 0.53 MG/DL (ref 0.57–1)
DEPRECATED RDW RBC AUTO: 42 FL (ref 37–54)
EGFRCR SERPLBLD CKD-EPI 2021: 123.9 ML/MIN/1.73
EOSINOPHIL # BLD AUTO: 0.09 10*3/MM3 (ref 0–0.4)
EOSINOPHIL NFR BLD AUTO: 0.7 % (ref 0.3–6.2)
ERYTHROCYTE [DISTWIDTH] IN BLOOD BY AUTOMATED COUNT: 13.6 % (ref 12.3–15.4)
GLUCOSE SERPL-MCNC: 60 MG/DL (ref 65–99)
HCT VFR BLD AUTO: 37.3 % (ref 34–46.6)
HGB BLD-MCNC: 12.2 G/DL (ref 12–15.9)
IMM GRANULOCYTES # BLD AUTO: 0.05 10*3/MM3 (ref 0–0.05)
IMM GRANULOCYTES NFR BLD AUTO: 0.4 % (ref 0–0.5)
LYMPHOCYTES # BLD AUTO: 3.41 10*3/MM3 (ref 0.7–3.1)
LYMPHOCYTES NFR BLD AUTO: 24.8 % (ref 19.6–45.3)
MCH RBC QN AUTO: 28 PG (ref 26.6–33)
MCHC RBC AUTO-ENTMCNC: 32.7 G/DL (ref 31.5–35.7)
MCV RBC AUTO: 85.7 FL (ref 79–97)
MONOCYTES # BLD AUTO: 0.8 10*3/MM3 (ref 0.1–0.9)
MONOCYTES NFR BLD AUTO: 5.8 % (ref 5–12)
NEUTROPHILS NFR BLD AUTO: 68 % (ref 42.7–76)
NEUTROPHILS NFR BLD AUTO: 9.37 10*3/MM3 (ref 1.7–7)
NRBC BLD AUTO-RTO: 0 /100 WBC (ref 0–0.2)
PLATELET # BLD AUTO: 400 10*3/MM3 (ref 140–450)
PMV BLD AUTO: 9.6 FL (ref 6–12)
POTASSIUM SERPL-SCNC: 3.7 MMOL/L (ref 3.5–5.2)
RBC # BLD AUTO: 4.35 10*6/MM3 (ref 3.77–5.28)
SODIUM SERPL-SCNC: 138 MMOL/L (ref 136–145)
WBC NRBC COR # BLD AUTO: 13.76 10*3/MM3 (ref 3.4–10.8)

## 2024-06-13 PROCEDURE — 36415 COLL VENOUS BLD VENIPUNCTURE: CPT

## 2024-06-13 PROCEDURE — 80048 BASIC METABOLIC PNL TOTAL CA: CPT

## 2024-06-13 PROCEDURE — 85025 COMPLETE CBC W/AUTO DIFF WBC: CPT

## 2024-06-18 ENCOUNTER — INITIAL PRENATAL (OUTPATIENT)
Dept: OBSTETRICS AND GYNECOLOGY | Facility: CLINIC | Age: 34
End: 2024-06-18
Payer: COMMERCIAL

## 2024-06-18 VITALS — DIASTOLIC BLOOD PRESSURE: 78 MMHG | BODY MASS INDEX: 27.44 KG/M2 | SYSTOLIC BLOOD PRESSURE: 124 MMHG | WEIGHT: 150 LBS

## 2024-06-18 DIAGNOSIS — Z34.81 PRENATAL CARE, SUBSEQUENT PREGNANCY IN FIRST TRIMESTER: Primary | ICD-10-CM

## 2024-06-18 DIAGNOSIS — O09.529 ANTEPARTUM MULTIGRAVIDA OF ADVANCED MATERNAL AGE: ICD-10-CM

## 2024-06-18 DIAGNOSIS — O09.219 PREVIOUS PRETERM DELIVERY, ANTEPARTUM: ICD-10-CM

## 2024-06-18 DIAGNOSIS — I49.3 PVC (PREMATURE VENTRICULAR CONTRACTION): ICD-10-CM

## 2024-06-18 DIAGNOSIS — Z34.80 SUPERVISION OF OTHER NORMAL PREGNANCY, ANTEPARTUM: ICD-10-CM

## 2024-06-18 PROBLEM — O02.0 ANEMBRYONIC PREGNANCY: Status: RESOLVED | Noted: 2023-07-31 | Resolved: 2024-06-18

## 2024-06-18 PROBLEM — Z31.9 DESIRE FOR PREGNANCY: Status: RESOLVED | Noted: 2022-11-11 | Resolved: 2024-06-18

## 2024-06-18 LAB
ABO GROUP BLD: NORMAL
BASOPHILS # BLD AUTO: 0.03 10*3/MM3 (ref 0–0.2)
BASOPHILS NFR BLD AUTO: 0.3 % (ref 0–1.5)
BLD GP AB SCN SERPL QL: NEGATIVE
DEPRECATED RDW RBC AUTO: 42.9 FL (ref 37–54)
EOSINOPHIL # BLD AUTO: 0.09 10*3/MM3 (ref 0–0.4)
EOSINOPHIL NFR BLD AUTO: 0.8 % (ref 0.3–6.2)
ERYTHROCYTE [DISTWIDTH] IN BLOOD BY AUTOMATED COUNT: 13.7 % (ref 12.3–15.4)
HBV SURFACE AG SERPL QL IA: NORMAL
HCT VFR BLD AUTO: 36.3 % (ref 34–46.6)
HCV AB SER QL: NORMAL
HGB BLD-MCNC: 12 G/DL (ref 12–15.9)
HIV 1+2 AB+HIV1 P24 AG SERPL QL IA: NORMAL
IMM GRANULOCYTES # BLD AUTO: 0.04 10*3/MM3 (ref 0–0.05)
IMM GRANULOCYTES NFR BLD AUTO: 0.4 % (ref 0–0.5)
LYMPHOCYTES # BLD AUTO: 2.74 10*3/MM3 (ref 0.7–3.1)
LYMPHOCYTES NFR BLD AUTO: 25.8 % (ref 19.6–45.3)
MCH RBC QN AUTO: 28.6 PG (ref 26.6–33)
MCHC RBC AUTO-ENTMCNC: 33.1 G/DL (ref 31.5–35.7)
MCV RBC AUTO: 86.6 FL (ref 79–97)
MONOCYTES # BLD AUTO: 0.65 10*3/MM3 (ref 0.1–0.9)
MONOCYTES NFR BLD AUTO: 6.1 % (ref 5–12)
NEUTROPHILS NFR BLD AUTO: 66.6 % (ref 42.7–76)
NEUTROPHILS NFR BLD AUTO: 7.08 10*3/MM3 (ref 1.7–7)
NRBC BLD AUTO-RTO: 0 /100 WBC (ref 0–0.2)
PLATELET # BLD AUTO: 376 10*3/MM3 (ref 140–450)
PMV BLD AUTO: 9.2 FL (ref 6–12)
RBC # BLD AUTO: 4.19 10*6/MM3 (ref 3.77–5.28)
RH BLD: POSITIVE
T PALLIDUM IGG SER QL: NORMAL
WBC NRBC COR # BLD AUTO: 10.63 10*3/MM3 (ref 3.4–10.8)

## 2024-06-18 PROCEDURE — G0432 EIA HIV-1/HIV-2 SCREEN: HCPCS | Performed by: NURSE PRACTITIONER

## 2024-06-18 PROCEDURE — 87591 N.GONORRHOEAE DNA AMP PROB: CPT | Performed by: NURSE PRACTITIONER

## 2024-06-18 PROCEDURE — 86762 RUBELLA ANTIBODY: CPT | Performed by: NURSE PRACTITIONER

## 2024-06-18 PROCEDURE — 87491 CHLMYD TRACH DNA AMP PROBE: CPT | Performed by: NURSE PRACTITIONER

## 2024-06-18 PROCEDURE — 87661 TRICHOMONAS VAGINALIS AMPLIF: CPT | Performed by: NURSE PRACTITIONER

## 2024-06-18 PROCEDURE — 86900 BLOOD TYPING SEROLOGIC ABO: CPT | Performed by: NURSE PRACTITIONER

## 2024-06-18 PROCEDURE — 86803 HEPATITIS C AB TEST: CPT | Performed by: NURSE PRACTITIONER

## 2024-06-18 PROCEDURE — 86901 BLOOD TYPING SEROLOGIC RH(D): CPT | Performed by: NURSE PRACTITIONER

## 2024-06-18 PROCEDURE — 85025 COMPLETE CBC W/AUTO DIFF WBC: CPT | Performed by: NURSE PRACTITIONER

## 2024-06-18 PROCEDURE — 36415 COLL VENOUS BLD VENIPUNCTURE: CPT | Performed by: NURSE PRACTITIONER

## 2024-06-18 PROCEDURE — 87340 HEPATITIS B SURFACE AG IA: CPT | Performed by: NURSE PRACTITIONER

## 2024-06-18 PROCEDURE — 0501F PRENATAL FLOW SHEET: CPT | Performed by: NURSE PRACTITIONER

## 2024-06-18 PROCEDURE — 86780 TREPONEMA PALLIDUM: CPT | Performed by: NURSE PRACTITIONER

## 2024-06-18 PROCEDURE — 83020 HEMOGLOBIN ELECTROPHORESIS: CPT | Performed by: NURSE PRACTITIONER

## 2024-06-18 PROCEDURE — 86850 RBC ANTIBODY SCREEN: CPT | Performed by: NURSE PRACTITIONER

## 2024-06-18 NOTE — PROGRESS NOTES
OB Initial Visit    CC- Here for care of current pregnancy, first visit    Subjective:  33 y.o.  presenting for her first obstetrical visit.    LMP: Patient's last menstrual period was 04/10/2024 (within days).     Pt has no complaints, is doing well    Happy for pregnancy    Reviewed and updated:  OBHx, GYNHx (STDs), PMHx, Medications, Allergies, PSHx, Social Hx, Preventative Hx (PAP), Hx of abuse/safe environment, Vaccine Hx including hx of chickenpox or vaccine, Genetic Hx (pt, FOB, both families).        Objective:  /78   Wt 68 kg (150 lb)   LMP 04/10/2024 (Within Days)   BMI 27.44 kg/m²        General- NAD, alert and oriented, appropriate  Psych- Normal mood, good memory  Neck- No masses, no thyroid enlargement  CV- Regular rhythm, no murnurs  Resp- CTA to bases, no wheezes  Abdomen- Soft, non distended, non tender, no masses    Breast left- deferred  Breast right- deferred    External genitalia- Normal, no lesions  Urethra- Normal, no masses, non tender  Vagina- Normal, no discharge  Bladder- Normal, no masses, non tender  Cvx- Normal, no lesions, no discharge, no CMT  Uterus- Normal shape and consistency, non tender, Consistent with dates, Bedside US consistent with dates.  -160..  Brief TAS shows viable IUP, +cardiac motion  Adnexa- Normal, no mass, non tender    Lymphatic- No palpable neck, axillary, or groin nodes  Ext- No edema, no cyanosis    Skin- No lesions, no rashes, no acanthosis nigricans    Assessment and Plan:  9w4d  Diagnoses and all orders for this visit:    1. Prenatal care, subsequent pregnancy in first trimester (Primary)  -     Cancel: POC Pregnancy, Urine  -     [id number] PLUS Core+SCA+ESS - Blood,    2. Supervision of other normal pregnancy, antepartum  Overview:  AMADEO finalize:Estimated Date of Delivery: 1/15/25 based on LMP, confirmed with early US       Genetic testing (NIPS-Quad)/CF/AFP:  NIPS pending, declines CF/SMA    COVID: fully vaccinated  Flu: Recommended    Tdap:Script 27-36 weeks   RSV: Script 32-36 weeks     Rhogam:     Sterilization:    Anatomy US:  FU US:    EPDS:   GTT:   28-32 weeks repeat TPA:    PROBLEM LIST/PLAN:   AMA-ASA 81mg daily     Hx of  delivery - G1 at 36 weeks, SROM    Hx of PVCs - no symptoms, no current meds      Orders:  -     POC Urinalysis Dipstick  -     OB Panel With HIV  -     Urine Culture - Urine, Urine, Clean Catch  -     Chlamydia trachomatis, Neisseria gonorrhoeae, Trichomonas vaginalis, PCR - Swab, Cervix  -     Urine Drug Screen - Urine, Clean Catch  -     Hemoglobinopathy Fractionation Cascade    3. Antepartum multigravida of advanced maternal age  Overview:  NIPS pending, MFM referral PRN  Plan NSTs beginning 32-36 weeks      4. PVC (premature ventricular contraction)  Overview:  No current medications, no symptoms      5. Previous  delivery, antepartum  Overview:  G1 at 36 weeks, SROM          Genetic Screening:   NIPS    Vaccines:   s/p COVID vaccine    Counseling:   Nutrition discussed, calories, activity/exercise in pregnancy  Discussed dietary restrictions/safety food preparation in pregnancy  Reviewed what to expect prenatal visits, office providers (female and male) and covering [facility] Hospitalists/Dr. Lopez  Appropriate trimester precautions provided, N/V, vag bleeding, cramping  Questions answered    Labs:   Prenatal labs, cultures, and PAP performed (prn)    Return in about 4 weeks (around 2024) for [facility], OB follow up, Mayi Montelongo.      Rowdy Villalobos, APRN  2024    [facility] [place] [facility] [address]  [facility]  [address] [address]  Dept: [phone]  Dept Fax: [phone]  Loc: [phone]

## 2024-06-20 LAB
C TRACH RRNA SPEC QL NAA+PROBE: NEGATIVE
HGB A MFR BLD ELPH: 97.6 % (ref 96.4–98.8)
HGB A2 MFR BLD ELPH: 2.4 % (ref 1.8–3.2)
HGB F MFR BLD ELPH: 0 % (ref 0–2)
HGB FRACT BLD-IMP: NORMAL
HGB S MFR BLD ELPH: 0 %
N GONORRHOEA RRNA SPEC QL NAA+PROBE: NEGATIVE
T VAGINALIS RRNA SPEC QL NAA+PROBE: NEGATIVE

## 2024-06-21 LAB — RUBV IGG SERPL IA-ACNC: 1.05 INDEX

## 2024-06-24 NOTE — PROGRESS NOTES
Patient is aware results negative. patient is wanting to bring in a pink and blue ball and wants us to give her the correct one back advised patient we can do that.

## 2024-07-01 LAB
AMPHET+METHAMPHET UR QL: NEGATIVE
BARBITURATES UR QL SCN: NEGATIVE
BENZODIAZ UR QL SCN: NEGATIVE
CANNABINOIDS SERPL QL: NEGATIVE
COCAINE UR QL: NEGATIVE
FENTANYL UR-MCNC: NEGATIVE NG/ML
METHADONE UR QL SCN: NEGATIVE
OPIATES UR QL: NEGATIVE
OXYCODONE UR QL SCN: NEGATIVE

## 2024-07-01 PROCEDURE — 80307 DRUG TEST PRSMV CHEM ANLYZR: CPT | Performed by: NURSE PRACTITIONER

## 2024-07-01 PROCEDURE — 87086 URINE CULTURE/COLONY COUNT: CPT | Performed by: NURSE PRACTITIONER

## 2024-07-02 LAB — BACTERIA SPEC AEROBE CULT: NO GROWTH

## 2024-07-17 ENCOUNTER — ROUTINE PRENATAL (OUTPATIENT)
Dept: OBSTETRICS AND GYNECOLOGY | Facility: CLINIC | Age: 34
End: 2024-07-17
Payer: COMMERCIAL

## 2024-07-17 VITALS — BODY MASS INDEX: 27.44 KG/M2 | DIASTOLIC BLOOD PRESSURE: 78 MMHG | WEIGHT: 150 LBS | SYSTOLIC BLOOD PRESSURE: 124 MMHG

## 2024-07-17 DIAGNOSIS — Z34.80 SUPERVISION OF OTHER NORMAL PREGNANCY, ANTEPARTUM: Primary | ICD-10-CM

## 2024-07-17 DIAGNOSIS — O09.529 ANTEPARTUM MULTIGRAVIDA OF ADVANCED MATERNAL AGE: ICD-10-CM

## 2024-07-17 DIAGNOSIS — O09.219 PREVIOUS PRETERM DELIVERY, ANTEPARTUM: ICD-10-CM

## 2024-07-17 DIAGNOSIS — R30.0 DYSURIA: ICD-10-CM

## 2024-07-17 DIAGNOSIS — N89.8 VAGINAL IRRITATION: ICD-10-CM

## 2024-07-17 DIAGNOSIS — I49.3 PVC (PREMATURE VENTRICULAR CONTRACTION): ICD-10-CM

## 2024-07-17 LAB
GLUCOSE UR STRIP-MCNC: NEGATIVE MG/DL
PROT UR STRIP-MCNC: NEGATIVE MG/DL

## 2024-07-17 PROCEDURE — 87086 URINE CULTURE/COLONY COUNT: CPT | Performed by: OBSTETRICS & GYNECOLOGY

## 2024-07-17 RX ORDER — CLOBETASOL PROPIONATE 0.5 MG/G
OINTMENT TOPICAL
Qty: 15 G | Refills: 1 | Status: SHIPPED | OUTPATIENT
Start: 2024-07-17

## 2024-07-17 NOTE — PROGRESS NOTES
Routine Prenatal Visit     Subjective  Brittani Rosas is a 33 y.o.  at 14w0d here for her routine OB visit.   She is taking her prenatal vitamins.Reports no loss of fluid or vaginal bleeding. Patient doing well but has had some increase in RUQ pain, she states its not worsened and she has tried to eat healthier which has improved the discomfort. She was counseled if it worsens that I would recommend RUQ US. She also continue to have vaginal irritation like she did prior to pregnancy. We will try steroid cream PRN to see if this helps. Pregnancy is complicated by:     Patient Active Problem List   Diagnosis    Infertility counseling    PVC (premature ventricular contraction)    Antepartum multigravida of advanced maternal age    Supervision of other normal pregnancy, antepartum    Previous  delivery, antepartum         OB History    Para Term  AB Living   3 1   1 1 1   SAB IAB Ectopic Molar Multiple Live Births   1         1      # Outcome Date GA Lbr Karthik/2nd Weight Sex Type Anes PTL Lv   3 Current            2 SAB 23 5w0d             Birth Comments: anembryonic gestation/chemical preg   1  16 36w0d  3175 g (7 lb) M Vaginal unsp  Y BRYAN           ROS:   General ROS: negative for - chills or fatigue  Respiratory ROS: negative for - cough or hemoptysis  Cardiovascular ROS: negative for - chest pain or dyspnea on exertion  Genito-Urinary ROS: negative for  change in urinary stream, vaginal discharge   Musculoskeletal ROS: negative for - gait disturbance or joint pain  Dermatological ROS: negative for acne,  dry skin or itching    Objective  Physical Exam:   Vitals:    24 1606   BP: 124/78       Uterine Size: not examined, US today  FHT: 110-160 BPM    General appearance - alert, well appearing, and in no distress  Mental status - alert, oriented to person, place, and time  Abdomen- Soft, Gravid uterus, non-tender to palpation  Musculoskeletal: negative for - gait  disturbance or joint pain  Extremeties: negative swelling or cyanosis   Dermatological: negative rashes or skin lesions       Assessment/Plan:   Diagnoses and all orders for this visit:    1. Supervision of other normal pregnancy, antepartum (Primary)  Assessment & Plan:  PROBLEM LIST/PLAN:   AMA-ASA 81mg daily     Hx of  delivery - G1 at 36 weeks, SROM, cervical length 16-24 weeks q2    Hx of PVCs - no symptoms, no current meds    Orders:  -     POC Urinalysis Dipstick    2. Antepartum multigravida of advanced maternal age    3. PVC (premature ventricular contraction)    4. Previous  delivery, antepartum  -     US Ob Transvaginal; Standing    5. Dysuria  -     Urine Culture - Urine, Urine, Clean Catch    6. Vaginal irritation  -     clobetasol (TEMOVATE) 0.05 % ointment; Use sparingly on an as needed basis up to twice a day for up to 2 weeks MAX.  Dispense: 15 g; Refill: 1          Counseling:   Second trimester precautions  Round Ligament Pain:  The uterus has several ligaments which provide support and keep the uterus in place. As the  uterus grows these ligaments are pulled and stretched which often causes sharp stabbing like pain in the inguinal area.   You may find a pregnancy support band helpful. Changing positions may also help. Yoga is a great way to cope with round ligament and low back pain in pregnancy.    Massage may also help with low back pain   Things to Consider at this Point in your Pregnancy:  Some women experience swelling in their feet during pregnancy. Compression stockings may help  Drink plenty of water and stay active   Make sure you are eating frequent small meals, nuts are a wonderful snack to keep with you            Return in about 4 weeks (around 2024) for Routine Ob visit with Anatomy scan.      We have gone over prenatal care to include the timing and content of visits. I informed her how to contact the office and/or on call person in the event of any problems and  encouraged her to do so when she feels it is necessary.  We then spent time answering her questions which she indicated were answered to her satisfaction.    Kimberly See,   7/17/2024 16:48 EDT

## 2024-07-17 NOTE — ASSESSMENT & PLAN NOTE
PROBLEM LIST/PLAN:   AMA-ASA 81mg daily     Hx of  delivery - G1 at 36 weeks, SROM, cervical length 16-24 weeks q2    Hx of PVCs - no symptoms, no current meds

## 2024-07-19 LAB — BACTERIA SPEC AEROBE CULT: NORMAL

## 2024-08-20 NOTE — PROGRESS NOTES
Routine Prenatal Visit     Subjective  Brittani Rosas is a 34 y.o.  at 19w4d here for her routine OB visit.   She is taking her prenatal vitamins.Reports no loss of fluid or vaginal bleeding. Patient doing well without any complaints. Pregnancy is complicated by:     Patient Active Problem List   Diagnosis    Infertility counseling    PVC (premature ventricular contraction)    Antepartum multigravida of advanced maternal age    Supervision of other normal pregnancy, antepartum    Previous  delivery, antepartum    Low-lying placenta         OB History    Para Term  AB Living   3 1   1 1 1   SAB IAB Ectopic Molar Multiple Live Births   1         1      # Outcome Date GA Lbr Karthik/2nd Weight Sex Type Anes PTL Lv   3 Current            2 SAB 23 5w0d             Birth Comments: anembryonic gestation/chemical preg   1  16 36w0d  3175 g (7 lb) M Vaginal unsp  Y BRYAN           ROS:   General ROS: negative for - chills or fatigue  Genito-Urinary ROS: negative for  change in urinary stream, vaginal discharge     Objective  Physical Exam:   Vitals:    24 1631   BP: 108/71       Uterine Size: not examined, US today  FHT: 110-160 BPM    General appearance - alert, well appearing, and in no distress  Abdomen- Soft, Gravid uterus, non-tender to palpation  Extremeties: negative swelling       Assessment/Plan:   Diagnoses and all orders for this visit:    1. Supervision of other normal pregnancy, antepartum (Primary)  Assessment & Plan:  AMADEO finalize:Estimated Date of Delivery: 25 based on LMP, confirmed with early US, ACOG  >>LMP 4/7, 7wk US    Genetic testing (NIPS-Quad)/CF/AFP:  NIPS negative, declines CF/SMA    COVID: fully vaccinated  Flu: Recommended   Tdap:Script 27-36 weeks   RSV: Script 32-36 weeks     Rhogam: Apos    Sterilization:    Anatomy US:  EFW 13%, AC 15%, posterior low-lying placenta 1.1 cm from internal os, bilateral choroid plexus cyst, limited  normal anatomy, cervical length 4.7 to 5.0 cm no funneling  FU US: Ordered    EPDS:   GTT:   28-32 weeks repeat TPA:    PROBLEM LIST/PLAN:   AMA-ASA 81mg daily     Hx of  delivery - G1 at 36 weeks, SROM, cervical length 16-24 weeks q2    Hx of PVCs - no symptoms, no current meds    Low-lying placenta noted at 19 weeks-1.1 cm from internal os, repeat ultrasound ordered    Orders:  -     POC Urinalysis Dipstick  -     US Ob 14 + Weeks Single or First Gestation; Future    2. Previous  delivery, antepartum    3. Low-lying placenta    4. Antepartum multigravida of advanced maternal age          Counseling:   Second trimester precautions  Round Ligament Pain:  The uterus has several ligaments which provide support and keep the uterus in place. As the  uterus grows these ligaments are pulled and stretched which often causes sharp stabbing like pain in the inguinal area.   You may find a pregnancy support band helpful. Changing positions may also help. Yoga is a great way to cope with round ligament and low back pain in pregnancy.    Massage may also help with low back pain   Things to Consider at this Point in your Pregnancy:  Some women experience swelling in their feet during pregnancy. Compression stockings may help  Drink plenty of water and stay active   Make sure you are eating frequent small meals, nuts are a wonderful snack to keep with you            Return in about 5 weeks (around 2024) for Routine OB visit. US and 1hr gtt      We have gone over prenatal care to include the timing and content of visits. I informed her how to contact the office and/or on call person in the event of any problems and encouraged her to do so when she feels it is necessary.  We then spent time answering her questions which she indicated were answered to her satisfaction.    Thelma Pérez,   2024 08:39 EDT

## 2024-08-21 ENCOUNTER — ROUTINE PRENATAL (OUTPATIENT)
Dept: OBSTETRICS AND GYNECOLOGY | Facility: CLINIC | Age: 34
End: 2024-08-21
Payer: COMMERCIAL

## 2024-08-21 VITALS — SYSTOLIC BLOOD PRESSURE: 108 MMHG | DIASTOLIC BLOOD PRESSURE: 71 MMHG | WEIGHT: 156 LBS | BODY MASS INDEX: 28.53 KG/M2

## 2024-08-21 DIAGNOSIS — Z34.80 SUPERVISION OF OTHER NORMAL PREGNANCY, ANTEPARTUM: Primary | ICD-10-CM

## 2024-08-21 DIAGNOSIS — O44.40 LOW-LYING PLACENTA: ICD-10-CM

## 2024-08-21 DIAGNOSIS — O09.529 ANTEPARTUM MULTIGRAVIDA OF ADVANCED MATERNAL AGE: ICD-10-CM

## 2024-08-21 DIAGNOSIS — O09.219 PREVIOUS PRETERM DELIVERY, ANTEPARTUM: ICD-10-CM

## 2024-08-21 LAB
GLUCOSE UR STRIP-MCNC: NEGATIVE MG/DL
PROT UR STRIP-MCNC: NEGATIVE MG/DL

## 2024-08-22 PROBLEM — O44.40 LOW-LYING PLACENTA: Status: ACTIVE | Noted: 2024-08-22

## 2024-08-22 NOTE — ASSESSMENT & PLAN NOTE
AMADEO finalize:Estimated Date of Delivery: 25 based on LMP, confirmed with early US, ACOG  >>LMP 4/7, 7wk US    Genetic testing (NIPS-Quad)/CF/AFP:  NIPS negative, declines CF/SMA    COVID: fully vaccinated  Flu: Recommended   Tdap:Script 27-36 weeks   RSV: Script 32-36 weeks     Rhogam: Apos    Sterilization:    Anatomy US:  EFW 13%, AC 15%, posterior low-lying placenta 1.1 cm from internal os, bilateral choroid plexus cyst, limited normal anatomy, cervical length 4.7 to 5.0 cm no funneling  FU US: Ordered    EPDS:   GTT:   28-32 weeks repeat TPA:    PROBLEM LIST/PLAN:   AMA-ASA 81mg daily     Hx of  delivery - G1 at 36 weeks, SROM, cervical length 16-24 weeks q2    Hx of PVCs - no symptoms, no current meds    Low-lying placenta noted at 19 weeks-1.1 cm from internal os, repeat ultrasound ordered

## 2024-09-05 ENCOUNTER — ROUTINE PRENATAL (OUTPATIENT)
Dept: OBSTETRICS AND GYNECOLOGY | Facility: CLINIC | Age: 34
End: 2024-09-05
Payer: COMMERCIAL

## 2024-09-05 VITALS — DIASTOLIC BLOOD PRESSURE: 82 MMHG | BODY MASS INDEX: 28.72 KG/M2 | WEIGHT: 157 LBS | SYSTOLIC BLOOD PRESSURE: 116 MMHG

## 2024-09-05 DIAGNOSIS — N89.8 VAGINAL IRRITATION: ICD-10-CM

## 2024-09-05 DIAGNOSIS — O09.219 PREVIOUS PRETERM DELIVERY, ANTEPARTUM: ICD-10-CM

## 2024-09-05 DIAGNOSIS — Z34.80 SUPERVISION OF OTHER NORMAL PREGNANCY, ANTEPARTUM: Primary | ICD-10-CM

## 2024-09-05 DIAGNOSIS — O09.529 ANTEPARTUM MULTIGRAVIDA OF ADVANCED MATERNAL AGE: ICD-10-CM

## 2024-09-05 DIAGNOSIS — O44.40 LOW-LYING PLACENTA: ICD-10-CM

## 2024-09-05 LAB
CANDIDA SPECIES: POSITIVE
GARDNERELLA VAGINALIS: NEGATIVE
GLUCOSE UR STRIP-MCNC: NEGATIVE MG/DL
PROT UR STRIP-MCNC: NEGATIVE MG/DL
T VAGINALIS DNA VAG QL PROBE+SIG AMP: NEGATIVE

## 2024-09-05 PROCEDURE — 87086 URINE CULTURE/COLONY COUNT: CPT | Performed by: OBSTETRICS & GYNECOLOGY

## 2024-09-05 PROCEDURE — 87660 TRICHOMONAS VAGIN DIR PROBE: CPT | Performed by: OBSTETRICS & GYNECOLOGY

## 2024-09-05 PROCEDURE — 87510 GARDNER VAG DNA DIR PROBE: CPT | Performed by: OBSTETRICS & GYNECOLOGY

## 2024-09-05 PROCEDURE — 87480 CANDIDA DNA DIR PROBE: CPT | Performed by: OBSTETRICS & GYNECOLOGY

## 2024-09-05 NOTE — PROGRESS NOTES
Routine Prenatal Visit     Subjective  Brittani Rosas is a 34 y.o.  at 21w4d here for her routine OB visit.   She is taking her prenatal vitamins.Reports no loss of fluid or vaginal bleeding. Patient doing well but has complained of some increased itching vaginally ands discharge, vaginitis swab and urine culture obtained. Pregnancy complicated by:     Patient Active Problem List   Diagnosis    Infertility counseling    PVC (premature ventricular contraction)    Antepartum multigravida of advanced maternal age    Supervision of other normal pregnancy, antepartum    Previous  delivery, antepartum    Low-lying placenta         OB History    Para Term  AB Living   3 1   1 1 1   SAB IAB Ectopic Molar Multiple Live Births   1         1      # Outcome Date GA Lbr Karthik/2nd Weight Sex Type Anes PTL Lv   3 Current            2 SAB 23 5w0d             Birth Comments: anembryonic gestation/chemical preg   1  16 36w0d  3175 g (7 lb) M Vaginal unsp  Y BRYAN           ROS:   General ROS: negative for - chills or fatigue  Respiratory ROS: negative for - cough or hemoptysis  Cardiovascular ROS: negative for - chest pain or dyspnea on exertion  Genito-Urinary ROS: negative for  change in urinary stream, vaginal discharge   Musculoskeletal ROS: negative for - gait disturbance or joint pain  Dermatological ROS: negative for acne,  dry skin or itching    Objective  Physical Exam:   Vitals:    24 1251   BP: 116/82       Uterine Size: size equals dates  FHT: 110-160 BPM    General appearance - alert, well appearing, and in no distress  Mental status - alert, oriented to person, place, and time  Abdomen- Soft, Gravid uterus, non-tender to palpation  Musculoskeletal: negative for - gait disturbance or joint pain  Extremeties: negative swelling or cyanosis   Dermatological: negative rashes or skin lesions   Vag screen performed    Assessment/Plan:   Diagnoses and all orders for this  visit:    1. Supervision of other normal pregnancy, antepartum (Primary)  Assessment & Plan:  PROBLEM LIST/PLAN:   AMA-ASA 81mg daily     Hx of  delivery - G1 at 36 weeks, SROM, cervical length 16-24 weeks q2    Hx of PVCs - no symptoms, no current meds    Low-lying placenta noted at 19 weeks-1.1 cm from internal os, repeat ultrasound ordered    Orders:  -     POC Urinalysis Dipstick  -     Urine Culture - Urine, Urine, Clean Catch    2. Previous  delivery, antepartum  Comments:  Continue cervical lengths    3. Antepartum multigravida of advanced maternal age  Comments:  Asa 81mg daily    4. Low-lying placenta  Comments:  pelvic rest, repeat US on 24    5. Vaginal irritation  -     Gardnerella vaginalis, Trichomonas vaginalis, Candida albicans, DNA - Swab, Cervix            Counseling:   Second trimester precautions  Round Ligament Pain:  The uterus has several ligaments which provide support and keep the uterus in place. As the  uterus grows these ligaments are pulled and stretched which often causes sharp stabbing like pain in the inguinal area.   You may find a pregnancy support band helpful. Changing positions may also help. Yoga is a great way to cope with round ligament and low back pain in pregnancy.    Massage may also help with low back pain   Things to Consider at this Point in your Pregnancy:  Some women experience swelling in their feet during pregnancy. Compression stockings may help  Drink plenty of water and stay active   Make sure you are eating frequent small meals, nuts are a wonderful snack to keep with you            Return in about 4 weeks (around 10/3/2024) for Routine OB visit.      We have gone over prenatal care to include the timing and content of visits. I informed her how to contact the office and/or on call person in the event of any problems and encouraged her to do so when she feels it is necessary.  We then spent time answering her questions which she indicated were  answered to her satisfaction.    Kimberly See DO  9/5/2024 13:12 EDT

## 2024-09-05 NOTE — ASSESSMENT & PLAN NOTE
PROBLEM LIST/PLAN:   AMA-ASA 81mg daily     Hx of  delivery - G1 at 36 weeks, SROM, cervical length 16-24 weeks q2    Hx of PVCs - no symptoms, no current meds    Low-lying placenta noted at 19 weeks-1.1 cm from internal os, repeat ultrasound ordered

## 2024-09-06 ENCOUNTER — TELEPHONE (OUTPATIENT)
Dept: OBSTETRICS AND GYNECOLOGY | Facility: CLINIC | Age: 34
End: 2024-09-06
Payer: COMMERCIAL

## 2024-09-06 DIAGNOSIS — B37.31 YEAST VAGINITIS: Primary | ICD-10-CM

## 2024-09-06 RX ORDER — TERCONAZOLE 0.4 %
1 CREAM WITH APPLICATOR VAGINAL NIGHTLY
Qty: 45 G | Refills: 1 | Status: SHIPPED | OUTPATIENT
Start: 2024-09-06 | End: 2024-09-13

## 2024-09-06 RX ORDER — FLUCONAZOLE 150 MG/1
150 TABLET ORAL DAILY
Qty: 1 TABLET | Refills: 2 | Status: SHIPPED | OUTPATIENT
Start: 2024-09-06

## 2024-09-06 NOTE — TELEPHONE ENCOUNTER
----- Message from Kimberly See sent at 9/6/2024  7:59 AM EDT -----  Yeast positive. Will send in cream for her and diflucan in which her  can take.     Electronically signed by:    Kimberly See DO  09/06/24  07:59 EDT

## 2024-09-07 LAB — BACTERIA SPEC AEROBE CULT: NORMAL

## 2024-09-23 ENCOUNTER — ROUTINE PRENATAL (OUTPATIENT)
Dept: OBSTETRICS AND GYNECOLOGY | Facility: CLINIC | Age: 34
End: 2024-09-23
Payer: COMMERCIAL

## 2024-09-23 VITALS — SYSTOLIC BLOOD PRESSURE: 108 MMHG | DIASTOLIC BLOOD PRESSURE: 72 MMHG

## 2024-09-23 DIAGNOSIS — Z34.80 SUPERVISION OF OTHER NORMAL PREGNANCY, ANTEPARTUM: Primary | ICD-10-CM

## 2024-09-23 DIAGNOSIS — O09.219 PREVIOUS PRETERM DELIVERY, ANTEPARTUM: ICD-10-CM

## 2024-09-23 PROBLEM — O44.40 LOW-LYING PLACENTA: Status: RESOLVED | Noted: 2024-08-22 | Resolved: 2024-09-23

## 2024-09-23 PROBLEM — Z31.69 INFERTILITY COUNSELING: Status: RESOLVED | Noted: 2022-11-18 | Resolved: 2024-09-23

## 2024-09-23 LAB
DEPRECATED RDW RBC AUTO: 39.9 FL (ref 37–54)
ERYTHROCYTE [DISTWIDTH] IN BLOOD BY AUTOMATED COUNT: 12.2 % (ref 12.3–15.4)
GLUCOSE 1H P GLC SERPL-MCNC: 167 MG/DL (ref 65–139)
GLUCOSE UR STRIP-MCNC: NEGATIVE MG/DL
HCT VFR BLD AUTO: 35.6 % (ref 34–46.6)
HGB BLD-MCNC: 11.5 G/DL (ref 12–15.9)
MCH RBC QN AUTO: 28.9 PG (ref 26.6–33)
MCHC RBC AUTO-ENTMCNC: 32.3 G/DL (ref 31.5–35.7)
MCV RBC AUTO: 89.4 FL (ref 79–97)
PLATELET # BLD AUTO: 338 10*3/MM3 (ref 140–450)
PMV BLD AUTO: 9.8 FL (ref 6–12)
PROT UR STRIP-MCNC: NEGATIVE MG/DL
RBC # BLD AUTO: 3.98 10*6/MM3 (ref 3.77–5.28)
TREPONEMA PALLIDUM IGG+IGM AB [PRESENCE] IN SERUM OR PLASMA BY IMMUNOASSAY: NORMAL
WBC NRBC COR # BLD AUTO: 12.4 10*3/MM3 (ref 3.4–10.8)

## 2024-09-23 PROCEDURE — 85027 COMPLETE CBC AUTOMATED: CPT | Performed by: OBSTETRICS & GYNECOLOGY

## 2024-09-23 PROCEDURE — 86780 TREPONEMA PALLIDUM: CPT | Performed by: OBSTETRICS & GYNECOLOGY

## 2024-09-23 PROCEDURE — 0502F SUBSEQUENT PRENATAL CARE: CPT | Performed by: OBSTETRICS & GYNECOLOGY

## 2024-09-23 PROCEDURE — 82950 GLUCOSE TEST: CPT | Performed by: OBSTETRICS & GYNECOLOGY

## 2024-09-25 ENCOUNTER — TELEPHONE (OUTPATIENT)
Dept: OBSTETRICS AND GYNECOLOGY | Facility: CLINIC | Age: 34
End: 2024-09-25
Payer: COMMERCIAL

## 2024-09-25 DIAGNOSIS — R73.09 ELEVATED GLUCOSE TOLERANCE TEST: Primary | ICD-10-CM

## 2024-09-27 ENCOUNTER — LAB (OUTPATIENT)
Dept: LAB | Facility: HOSPITAL | Age: 34
End: 2024-09-27
Payer: COMMERCIAL

## 2024-09-27 DIAGNOSIS — R73.09 ELEVATED GLUCOSE TOLERANCE TEST: ICD-10-CM

## 2024-09-27 LAB
GLUCOSE 1H P 100 G GLC PO SERPL-MCNC: 154 MG/DL (ref 74–180)
GLUCOSE 2H P 100 G GLC PO SERPL-MCNC: 174 MG/DL (ref 74–155)
GLUCOSE 3H P 100 G GLC PO SERPL-MCNC: 129 MG/DL (ref 74–140)
GLUCOSE BLDC GLUCOMTR-MCNC: 91 MG/DL (ref 70–99)
GLUCOSE P FAST SERPL-MCNC: 98 MG/DL (ref 74–106)

## 2024-09-27 PROCEDURE — 82951 GLUCOSE TOLERANCE TEST (GTT): CPT

## 2024-09-27 PROCEDURE — 82952 GTT-ADDED SAMPLES: CPT

## 2024-09-27 PROCEDURE — 36415 COLL VENOUS BLD VENIPUNCTURE: CPT

## 2024-10-01 PROBLEM — O99.810 ABNORMAL GLUCOSE IN PREGNANCY, ANTEPARTUM: Status: ACTIVE | Noted: 2024-10-01

## 2024-10-11 NOTE — PROGRESS NOTES
OB FOLLOW UP        No chief complaint on file.      Subjective:   {OBROSPOS (Optional):27583}    Objective:  LMP 2024   Uterine Size: {APOBFUNDALHEIGHT:92426}  FHT: 110-160 BPM  {GBS:41031}  See OB flow for LE edema, cvx exam if performed, and Upro/Uglu    Assessment and Plan:  26w5d  Reassuring pregnancy progress.  Questions answered.  Diagnoses and all orders for this visit:    1. Supervision of other normal pregnancy, antepartum (Primary)  Overview:  AMADEO finalize:Estimated Date of Delivery: 25 based on LMP, confirmed with early US, ACOG  >>LMP 4/7, 7wk US    Genetic testing (NIPS-Quad)/CF/AFP:  NIPS negative, declines CF/SMA    COVID: fully vaccinated  Flu: Recommended   Tdap:Script 27-36 weeks   RSV: Script 32-36 weeks     Rhogam: Apos    Sterilization:    Anatomy US:  EFW 13%, AC 15%, posterior low-lying placenta 1.1 cm from internal os, bilateral choroid plexus cyst, limited normal anatomy, cervical length 4.7 to 5.0 cm no funneling  FU US: Repeat US shows low lying placenta resolved, Yue 22cm, choroid plexus cyst resolved     EPDS:   GTT:   28-32 weeks repeat TPA:    PROBLEM LIST/PLAN:   AMA-ASA 81mg daily     Hx of  delivery - G1 at 36 weeks, SROM, cervical length 16-24 weeks q2    Hx of PVCs - no symptoms, no current meds    Low-lying placenta noted at 19 weeks-1.1 cm from internal os, repeat ultrasound ordered        Counseling:    {APOBPRECAUTIONS:63115}  Continue PNV.  Importance of healthy eating and exercise.    No follow-ups on file.    {Time Spent-Use for E/M Coding (Optional):01725}        Rowdy Villalobos, APRN  10/14/2024    [facility] [place] [facility] [address]  [facility]  [address] [address]  Dept: [phone]  Dept Fax: [phone]  Loc: [phone]

## 2024-10-14 ENCOUNTER — ROUTINE PRENATAL (OUTPATIENT)
Dept: OBSTETRICS AND GYNECOLOGY | Facility: CLINIC | Age: 34
End: 2024-10-14
Payer: COMMERCIAL

## 2024-10-14 VITALS — WEIGHT: 164 LBS | BODY MASS INDEX: 30 KG/M2 | DIASTOLIC BLOOD PRESSURE: 73 MMHG | SYSTOLIC BLOOD PRESSURE: 114 MMHG

## 2024-10-14 DIAGNOSIS — Z34.80 SUPERVISION OF OTHER NORMAL PREGNANCY, ANTEPARTUM: Primary | ICD-10-CM

## 2024-10-14 LAB
GLUCOSE UR STRIP-MCNC: NEGATIVE MG/DL
PROT UR STRIP-MCNC: NEGATIVE MG/DL

## 2024-10-14 PROCEDURE — 0502F SUBSEQUENT PRENATAL CARE: CPT | Performed by: NURSE PRACTITIONER

## 2024-10-14 NOTE — ASSESSMENT & PLAN NOTE
Doing well, no complaints  Encouraged support belt  Planning to receive flu and Tdap vaccines  Fetal kick counts   labor precautions

## 2024-10-14 NOTE — PROGRESS NOTES
OB FOLLOW UP      Chief Complaint   Patient presents with    Routine Prenatal Visit     Subjective:   No complaints    Objective:  /73   Wt 74.4 kg (164 lb)   LMP 2024   BMI 30.00 kg/m²  8.618 kg (19 lb)  Uterine Size: size equals dates  FHT: 110-160 BPM    See OB flow for edema, cvx exam if performed, and Upro/Uglu    Assessment and Plan:  27w1d  Reassuring pregnancy progress.  Questions answered.  Diagnoses and all orders for this visit:    1. Supervision of other normal pregnancy, antepartum (Primary)  Overview:  AMADEO finalize:Estimated Date of Delivery: 25 based on LMP, confirmed with early US, ACOG  >>LMP 4/7, 7wk US    Genetic testing (NIPS-Quad)/CF/AFP:  NIPS negative, declines CF/SMA    COVID: fully vaccinated  Flu: Recommended   Tdap:Script 27-36 weeks   RSV: Script 32-36 weeks     Rhogam: Apos    Sterilization:    Anatomy US:  EFW 13%, AC 15%, posterior low-lying placenta 1.1 cm from internal os, bilateral choroid plexus cyst, limited normal anatomy, cervical length 4.7 to 5.0 cm no funneling  FU US: Repeat US shows low lying placenta resolved, Yue 22cm, choroid plexus cyst resolved     EPDS:   GTT:   28-32 weeks repeat TPA:    PROBLEM LIST/PLAN:   AMA-ASA 81mg daily     Hx of  delivery - G1 at 36 weeks, SROM, cervical length 16-24 weeks q2    Hx of PVCs - no symptoms, no current meds    Low-lying placenta noted at 19 weeks-1.1 cm from internal os, repeat ultrasound ordered    Assessment & Plan:  Doing well, no complaints  Encouraged support belt  Planning to receive flu and Tdap vaccines  Fetal kick counts   labor precautions    Orders:  -     POC Urinalysis Dipstick      Counseling:    OB precautions, leaking, VB, vito mauricio vs PTL/Labor  [facility]  Continue PNV.  Importance of healthy eating and staying active.    Return in about 29 days (around 2024) for Mayi Montelongo, OB follow up.        Rowdy Villalobos, APRN  10/14/2024    [facility] [place] [facility]  [facility]  [address] [place] [address] [place] [place] [address]  Dept: [phone]  Dept Fax: [phone]  Loc: [phone]

## 2024-10-24 ENCOUNTER — PATIENT MESSAGE (OUTPATIENT)
Dept: OBSTETRICS AND GYNECOLOGY | Facility: CLINIC | Age: 34
End: 2024-10-24
Payer: COMMERCIAL

## 2024-11-12 ENCOUNTER — ROUTINE PRENATAL (OUTPATIENT)
Dept: OBSTETRICS AND GYNECOLOGY | Facility: CLINIC | Age: 34
End: 2024-11-12
Payer: COMMERCIAL

## 2024-11-12 VITALS — DIASTOLIC BLOOD PRESSURE: 80 MMHG | SYSTOLIC BLOOD PRESSURE: 122 MMHG | WEIGHT: 161 LBS | BODY MASS INDEX: 29.45 KG/M2

## 2024-11-12 DIAGNOSIS — O09.529 ANTEPARTUM MULTIGRAVIDA OF ADVANCED MATERNAL AGE: ICD-10-CM

## 2024-11-12 DIAGNOSIS — Z34.80 SUPERVISION OF OTHER NORMAL PREGNANCY, ANTEPARTUM: Primary | ICD-10-CM

## 2024-11-12 DIAGNOSIS — O09.219 PREVIOUS PRETERM DELIVERY, ANTEPARTUM: ICD-10-CM

## 2024-11-12 LAB
GLUCOSE UR STRIP-MCNC: NEGATIVE MG/DL
PROT UR STRIP-MCNC: NEGATIVE MG/DL

## 2024-11-12 NOTE — PROGRESS NOTES
Routine Prenatal Visit     Subjective  Brittani Rosas is a 34 y.o.  at 31w2d here for her routine OB visit.   She is taking her prenatal vitamins.Reports no loss of fluid or vaginal bleeding. Patient doing well without any complaints. Pregnancy complicated by:     Patient Active Problem List   Diagnosis    PVC (premature ventricular contraction)    Antepartum multigravida of advanced maternal age    Supervision of other normal pregnancy, antepartum    Previous  delivery, antepartum    Abnormal glucose in pregnancy, antepartum         OB History    Para Term  AB Living   3 1   1 1 1   SAB IAB Ectopic Molar Multiple Live Births   1         1      # Outcome Date GA Lbr Karthik/2nd Weight Sex Type Anes PTL Lv   3 Current            2 SAB 23 5w0d             Birth Comments: anembryonic gestation/chemical preg   1  16 36w0d  3175 g (7 lb) M Vaginal unsp  Y BRYAN           ROS:   Review of Systems - General ROS: negative  Psychological ROS: negative  Allergy and Immunology ROS: negative  Endocrine ROS: negative  Breast ROS: negative  Respiratory ROS: negative  Cardiovascular ROS: negative  Gastrointestinal ROS: negative  Genito-Urinary ROS: negative  Musculoskeletal ROS: positive for - joint pain and joint stiffness  Neurological ROS: negative  Dermatological ROS: negative    Objective  Physical Exam:   Vitals:    24 1546   BP: 122/80       Uterine Size: size equals dates  FHT: 110-160 BPM    General appearance - alert, well appearing, and in no distress  Mental status - alert, oriented to person, place, and time  Abdomen- Soft, Gravid uterus, non-tender to palpation  Musculoskeletal: negative for - gait disturbance or joint pain  Extremeties: negative swelling or cyanosis   Dermatological: negative rashes or skin lesions       Assessment/Plan:   Diagnoses and all orders for this visit:    1. Supervision of other normal pregnancy, antepartum (Primary)  -     POC Urinalysis  Dipstick    2. Antepartum multigravida of advanced maternal age  Comments:   testing 34 weeks    3. Previous  delivery, antepartum            Counseling:   OB precautions, leaking, VB, vito mauricio vs PTL/Labor  [facility]  HTN precautions reviewed: HA, vision change, RUQ/epigastric pain, edema  Round Ligament Pain:  The uterus has several ligaments which provide support and keep the uterus in place. As the  uterus grows these ligaments are pulled and stretched which often causes sharp stabbing like pain in the inguinal area.   You may find a pregnancy support band helpful. Changing positions may also help. Yoga is a great way to cope with round ligament and low back pain in pregnancy.    Massage may also help with low back pain   Things to Consider at this Point in your Pregnancy:  Some women experience swelling in their feet during pregnancy. Compression stockings may help  Drink plenty of water and stay active   Make sure you are eating frequent small meals, nuts are a wonderful snack to keep with you            Return in about 2 weeks (around 2024) for Routine OB visit.      We have gone over prenatal care to include the timing and content of visits. I informed her how to contact the office and/or on call person in the event of any problems and encouraged her to do so when she feels it is necessary.  We then spent time answering her questions which she indicated were answered to her satisfaction.    Kimberly See DO  2024 16:39 EST

## 2024-11-26 ENCOUNTER — ROUTINE PRENATAL (OUTPATIENT)
Dept: OBSTETRICS AND GYNECOLOGY | Facility: CLINIC | Age: 34
End: 2024-11-26
Payer: COMMERCIAL

## 2024-11-26 VITALS — SYSTOLIC BLOOD PRESSURE: 112 MMHG | WEIGHT: 165 LBS | BODY MASS INDEX: 30.18 KG/M2 | DIASTOLIC BLOOD PRESSURE: 76 MMHG

## 2024-11-26 DIAGNOSIS — O26.849 UTERINE SIZE-DATE DISCREPANCY, ANTEPARTUM: ICD-10-CM

## 2024-11-26 DIAGNOSIS — O09.219 PREVIOUS PRETERM DELIVERY, ANTEPARTUM: ICD-10-CM

## 2024-11-26 DIAGNOSIS — O09.529 ANTEPARTUM MULTIGRAVIDA OF ADVANCED MATERNAL AGE: ICD-10-CM

## 2024-11-26 DIAGNOSIS — Z34.80 SUPERVISION OF OTHER NORMAL PREGNANCY, ANTEPARTUM: Primary | ICD-10-CM

## 2024-11-26 LAB
GLUCOSE UR STRIP-MCNC: NEGATIVE MG/DL
PROT UR STRIP-MCNC: NEGATIVE MG/DL

## 2024-11-26 PROCEDURE — 0502F SUBSEQUENT PRENATAL CARE: CPT | Performed by: OBSTETRICS & GYNECOLOGY

## 2024-11-26 NOTE — PROGRESS NOTES
Routine Prenatal Visit     Subjective  Brittani Rosas is a 34 y.o.  at 33w2d here for her routine OB visit.   She is taking her prenatal vitamins.Reports no loss of fluid or vaginal bleeding. Patient doing well without any complaints. Pregnancy complicated by:     Patient Active Problem List   Diagnosis    PVC (premature ventricular contraction)    Antepartum multigravida of advanced maternal age    Supervision of other normal pregnancy, antepartum    Previous  delivery, antepartum    Abnormal glucose in pregnancy, antepartum         OB History    Para Term  AB Living   3 1   1 1 1   SAB IAB Ectopic Molar Multiple Live Births   1         1      # Outcome Date GA Lbr Karthik/2nd Weight Sex Type Anes PTL Lv   3 Current            2 SAB 23 5w0d             Birth Comments: anembryonic gestation/chemical preg   1  16 36w0d  3175 g (7 lb) M Vaginal unsp  Y BRYAN           ROS:   Review of Systems - General ROS: negative  Psychological ROS: negative  Endocrine ROS: negative  Breast ROS: negative  Respiratory ROS: negative  Cardiovascular ROS: negative  Gastrointestinal ROS: negative  Genito-Urinary ROS: negative  Musculoskeletal ROS: negative  Neurological ROS: negative  Dermatological ROS: negative    Objective  Physical Exam:   Vitals:    24 1427   BP: 112/76       Uterine Size: size greater than dates  FHT: 110-160 BPM    General appearance - alert, well appearing, and in no distress  Mental status - alert, oriented to person, place, and time  Abdomen- Soft, Gravid uterus, non-tender to palpation  Musculoskeletal: negative for - gait disturbance or joint pain  Extremeties: negative swelling or cyanosis   Dermatological: negative rashes or skin lesions       Assessment/Plan:   Diagnoses and all orders for this visit:    1. 33 week pregnancy (Primary)  -     POC Urinalysis Dipstick    2. Antepartum multigravida of advanced maternal age  Comments:   testing  ASA  81mg daily    3. Previous  delivery, antepartum    4. Uterine size-date discrepancy, antepartum  -     US Ob 14 + Weeks Single or First Gestation; Future            Counseling:   OB precautions, leaking, VB, vito mauricio vs PTL/Labor  [facility]  HTN precautions reviewed: HA, vision change, RUQ/epigastric pain, edema  Round Ligament Pain:  The uterus has several ligaments which provide support and keep the uterus in place. As the  uterus grows these ligaments are pulled and stretched which often causes sharp stabbing like pain in the inguinal area.   You may find a pregnancy support band helpful. Changing positions may also help. Yoga is a great way to cope with round ligament and low back pain in pregnancy.    Massage may also help with low back pain   Things to Consider at this Point in your Pregnancy:  Some women experience swelling in their feet during pregnancy. Compression stockings may help  Drink plenty of water and stay active   Make sure you are eating frequent small meals, nuts are a wonderful snack to keep with you            Return in about 2 weeks (around 12/10/2024) for Routine OB visit.      We have gone over prenatal care to include the timing and content of visits. I informed her how to contact the office and/or on call person in the event of any problems and encouraged her to do so when she feels it is necessary.  We then spent time answering her questions which she indicated were answered to her satisfaction.    Kimberly See DO  2024 14:39 EST

## 2024-12-04 ENCOUNTER — TELEPHONE (OUTPATIENT)
Dept: OBSTETRICS AND GYNECOLOGY | Facility: CLINIC | Age: 34
End: 2024-12-04
Payer: COMMERCIAL

## 2024-12-04 DIAGNOSIS — R30.0 DYSURIA: Primary | ICD-10-CM

## 2024-12-04 DIAGNOSIS — B37.31 YEAST VAGINITIS: ICD-10-CM

## 2024-12-04 RX ORDER — FLUCONAZOLE 150 MG/1
150 TABLET ORAL ONCE
Qty: 1 TABLET | Refills: 0 | Status: SHIPPED | OUTPATIENT
Start: 2024-12-04 | End: 2024-12-05

## 2024-12-04 NOTE — TELEPHONE ENCOUNTER
OB patient, 34w3d, states that she has been having vaginal itching and d/c, thinks she could have another yeast infection, treated for one about 2 months ago. Also states that she is having urinary frequency and urgency. 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 I have attached the order for a urine culture.  Please advise.

## 2024-12-06 ENCOUNTER — HOSPITAL ENCOUNTER (OUTPATIENT)
Facility: HOSPITAL | Age: 34
Discharge: HOME | End: 2024-12-06
Attending: STUDENT IN AN ORGANIZED HEALTH CARE EDUCATION/TRAINING PROGRAM | Admitting: STUDENT IN AN ORGANIZED HEALTH CARE EDUCATION/TRAINING PROGRAM
Payer: COMMERCIAL

## 2024-12-06 PROCEDURE — 59025 FETAL NON-STRESS TEST: CPT

## 2024-12-06 RX ORDER — ASPIRIN 81 MG/1
81 TABLET, CHEWABLE ORAL DAILY
COMMUNITY

## 2024-12-06 NOTE — NON STRESS TEST
Obstetrical Non-stress Test Interpretation     Name:  Brittani Rosas  MRN: [id number]    34 y.o. female  at 34w5d    Indication: ama      Fetal Assessment  Fetal Movement: active  Fetal HR Assessment Method: external  Fetal HR (beats/min): 140  Fetal HR Baseline: normal range  Fetal HR Variability: moderate (amplitude range 6 to 25 bpm)  Fetal HR Accelerations: greater than/equal to 15 bpm, lasting at least 15 seconds  Fetal HR Decelerations: absent    LMP 2024     Reason for test: Other (Comment)  Date of Test: 2024  Time frame of test: 4246-5031  RN NST Interpretation: Reactive      Nancie Whitehead  2024  13:32 EST

## 2024-12-10 ENCOUNTER — ROUTINE PRENATAL (OUTPATIENT)
Dept: OBSTETRICS AND GYNECOLOGY | Facility: CLINIC | Age: 34
End: 2024-12-10
Payer: COMMERCIAL

## 2024-12-10 ENCOUNTER — TELEPHONE (OUTPATIENT)
Dept: OBSTETRICS AND GYNECOLOGY | Facility: CLINIC | Age: 34
End: 2024-12-10

## 2024-12-10 ENCOUNTER — PATIENT MESSAGE (OUTPATIENT)
Dept: OBSTETRICS AND GYNECOLOGY | Facility: CLINIC | Age: 34
End: 2024-12-10

## 2024-12-10 ENCOUNTER — LAB (OUTPATIENT)
Facility: HOSPITAL | Age: 34
End: 2024-12-10
Payer: COMMERCIAL

## 2024-12-10 VITALS — WEIGHT: 167 LBS | SYSTOLIC BLOOD PRESSURE: 124 MMHG | DIASTOLIC BLOOD PRESSURE: 78 MMHG | BODY MASS INDEX: 30.54 KG/M2

## 2024-12-10 DIAGNOSIS — O09.219 PREVIOUS PRETERM DELIVERY, ANTEPARTUM: ICD-10-CM

## 2024-12-10 DIAGNOSIS — O26.849 UTERINE SIZE-DATE DISCREPANCY, ANTEPARTUM: ICD-10-CM

## 2024-12-10 DIAGNOSIS — R30.0 DYSURIA: ICD-10-CM

## 2024-12-10 DIAGNOSIS — O09.529 ANTEPARTUM MULTIGRAVIDA OF ADVANCED MATERNAL AGE: Primary | ICD-10-CM

## 2024-12-10 LAB
DEPRECATED RDW RBC AUTO: 42.1 FL (ref 37–54)
ERYTHROCYTE [DISTWIDTH] IN BLOOD BY AUTOMATED COUNT: 13.7 % (ref 12.3–15.4)
GLUCOSE UR STRIP-MCNC: NEGATIVE MG/DL
HCT VFR BLD AUTO: 36.9 % (ref 34–46.6)
HGB BLD-MCNC: 12.6 G/DL (ref 12–15.9)
MCH RBC QN AUTO: 29.3 PG (ref 26.6–33)
MCHC RBC AUTO-ENTMCNC: 34.1 G/DL (ref 31.5–35.7)
MCV RBC AUTO: 85.8 FL (ref 79–97)
PLATELET # BLD AUTO: 299 10*3/MM3 (ref 140–450)
PMV BLD AUTO: 10.4 FL (ref 6–12)
PROT UR STRIP-MCNC: NEGATIVE MG/DL
RBC # BLD AUTO: 4.3 10*6/MM3 (ref 3.77–5.28)
WBC NRBC COR # BLD AUTO: 11.63 10*3/MM3 (ref 3.4–10.8)

## 2024-12-10 PROCEDURE — 87086 URINE CULTURE/COLONY COUNT: CPT | Performed by: OBSTETRICS & GYNECOLOGY

## 2024-12-10 PROCEDURE — 36415 COLL VENOUS BLD VENIPUNCTURE: CPT | Performed by: OBSTETRICS & GYNECOLOGY

## 2024-12-10 PROCEDURE — 85027 COMPLETE CBC AUTOMATED: CPT | Performed by: OBSTETRICS & GYNECOLOGY

## 2024-12-10 PROCEDURE — 87653 STREP B DNA AMP PROBE: CPT | Performed by: OBSTETRICS & GYNECOLOGY

## 2024-12-10 RX ORDER — TERCONAZOLE 4 MG/G
1 CREAM VAGINAL NIGHTLY
Qty: 45 G | Refills: 1 | Status: SHIPPED | OUTPATIENT
Start: 2024-12-10 | End: 2024-12-17

## 2024-12-10 NOTE — PROGRESS NOTES
Routine Prenatal Visit     Subjective  Brittani Rosas is a 34 y.o.  at 35w2d here for her routine OB visit.   She is taking her prenatal vitamins.Reports no loss of fluid or vaginal bleeding. Patient doing well without any complaints. Pregnancy complicated by:     Patient Active Problem List   Diagnosis    PVC (premature ventricular contraction)    Antepartum multigravida of advanced maternal age    Supervision of other normal pregnancy, antepartum    Previous  delivery, antepartum    Abnormal glucose in pregnancy, antepartum         OB History    Para Term  AB Living   3 1   1 1 1   SAB IAB Ectopic Molar Multiple Live Births   1         1      # Outcome Date GA Lbr Karthik/2nd Weight Sex Type Anes PTL Lv   3 Current            2 SAB 23 5w0d             Birth Comments: anembryonic gestation/chemical preg   1  16 36w0d  3175 g (7 lb) M Vaginal unsp  Y BRYAN           ROS:   Review of Systems - General ROS: negative  Psychological ROS: negative  Endocrine ROS: negative  Breast ROS: negative  Respiratory ROS: negative  Cardiovascular ROS: negative  Gastrointestinal ROS: negative  Genito-Urinary ROS: negative  Musculoskeletal ROS: negative  Neurological ROS: negative  Dermatological ROS: negative    Objective  Physical Exam:   Vitals:    12/10/24 0823   BP: 124/78       Uterine Size: size equals dates  FHT: 110-160 BPM    General appearance - alert, well appearing, and in no distress  Mental status - alert, oriented to person, place, and time  Abdomen- Soft, Gravid uterus, non-tender to palpation  Musculoskeletal: negative for - gait disturbance or joint pain  Extremeties: negative swelling or cyanosis   Dermatological: negative rashes or skin lesions   GBS RV swab performed today    Assessment/Plan:   Diagnoses and all orders for this visit:    1. Antepartum multigravida of advanced maternal age (Primary)  -     POC Urinalysis Dipstick  -     CBC (No Diff)  -     Group B  Strep (Molecular) - Swab, Vaginal/Rectum    2. Previous  delivery, antepartum    3. Uterine size-date discrepancy, antepartum  Comments:  Growth US today            Counseling:   OB precautions, leaking, VB, vito mauricio vs PTL/Labor  [facility]  HTN precautions reviewed: HA, vision change, RUQ/epigastric pain, edema  Round Ligament Pain:  The uterus has several ligaments which provide support and keep the uterus in place. As the  uterus grows these ligaments are pulled and stretched which often causes sharp stabbing like pain in the inguinal area.   You may find a pregnancy support band helpful. Changing positions may also help. Yoga is a great way to cope with round ligament and low back pain in pregnancy.    Massage may also help with low back pain   Things to Consider at this Point in your Pregnancy:  Some women experience swelling in their feet during pregnancy. Compression stockings may help  Drink plenty of water and stay active   Make sure you are eating frequent small meals, nuts are a wonderful snack to keep with you            Return in about 1 week (around 2024) for Routine OB visit.      We have gone over prenatal care to include the timing and content of visits. I informed her how to contact the office and/or on call person in the event of any problems and encouraged her to do so when she feels it is necessary.  We then spent time answering her questions which she indicated were answered to her satisfaction.    Kimberly See DO  12/10/2024 08:38 EST

## 2024-12-11 LAB
BACTERIA SPEC AEROBE CULT: NORMAL
GROUP B STREP, DNA: POSITIVE

## 2024-12-13 ENCOUNTER — HOSPITAL ENCOUNTER (OUTPATIENT)
Facility: HOSPITAL | Age: 34
Discharge: HOME | End: 2024-12-13
Attending: OBSTETRICS & GYNECOLOGY | Admitting: OBSTETRICS & GYNECOLOGY
Payer: COMMERCIAL

## 2024-12-13 ENCOUNTER — HOSPITAL ENCOUNTER (OUTPATIENT)
Dept: LABOR AND DELIVERY | Facility: HOSPITAL | Age: 34
Discharge: HOME | End: 2024-12-13
Payer: COMMERCIAL

## 2024-12-13 VITALS
TEMPERATURE: 98.1 F | RESPIRATION RATE: 20 BRPM | SYSTOLIC BLOOD PRESSURE: 117 MMHG | HEART RATE: 80 BPM | DIASTOLIC BLOOD PRESSURE: 73 MMHG | OXYGEN SATURATION: 100 %

## 2024-12-13 PROCEDURE — 59025 FETAL NON-STRESS TEST: CPT

## 2024-12-13 PROCEDURE — 59025 FETAL NON-STRESS TEST: CPT | Performed by: OBSTETRICS & GYNECOLOGY

## 2024-12-13 NOTE — NON STRESS TEST
Obstetrical Non-stress Test Interpretation     Name:  Brittani Rosas  MRN: [id number]    34 y.o. female  at 35w5d    Indication: AMA      Fetal Assessment  Fetal Movement: active  Fetal HR Assessment Method: external  Fetal HR (beats/min): 125  Fetal HR Baseline: normal range  Fetal HR Variability: moderate (amplitude range 6 to 25 bpm)  Fetal HR Accelerations: greater than/equal to 15 bpm, lasting at least 15 seconds  Fetal HR Decelerations: absent    /73 (BP Location: Right arm, Patient Position: Sitting)   Pulse 80   Temp 98.1 °F (36.7 °C) (Oral)   Resp 20   LMP 2024   SpO2 100%     Reason for test:  AMA  Date of Test: 2024  Time frame of test: 8300-2227  RN NST Interpretation:  Reactive       Florecita Tan RN  2024  13:55 EST

## 2024-12-16 ENCOUNTER — ROUTINE PRENATAL (OUTPATIENT)
Dept: OBSTETRICS AND GYNECOLOGY | Facility: CLINIC | Age: 34
End: 2024-12-16
Payer: COMMERCIAL

## 2024-12-16 VITALS — BODY MASS INDEX: 31.28 KG/M2 | SYSTOLIC BLOOD PRESSURE: 116 MMHG | DIASTOLIC BLOOD PRESSURE: 82 MMHG | WEIGHT: 171 LBS

## 2024-12-16 DIAGNOSIS — O09.219 PREVIOUS PRETERM DELIVERY, ANTEPARTUM: ICD-10-CM

## 2024-12-16 DIAGNOSIS — O09.529 ANTEPARTUM MULTIGRAVIDA OF ADVANCED MATERNAL AGE: Primary | ICD-10-CM

## 2024-12-16 LAB
GLUCOSE UR STRIP-MCNC: NEGATIVE MG/DL
PROT UR STRIP-MCNC: NEGATIVE MG/DL

## 2024-12-16 NOTE — PROGRESS NOTES
Routine Prenatal Visit     Subjective  Brittani Rosas is a 34 y.o.  at 36w1d here for her routine OB visit.   She is taking her prenatal vitamins.Reports no loss of fluid or vaginal bleeding. Patient doing well without any complaints. Pregnancy complicated by:     Patient Active Problem List   Diagnosis    PVC (premature ventricular contraction)    Antepartum multigravida of advanced maternal age    Supervision of other normal pregnancy, antepartum    Previous  delivery, antepartum    Abnormal glucose in pregnancy, antepartum         OB History    Para Term  AB Living   3 1   1 1 1   SAB IAB Ectopic Molar Multiple Live Births   1         1      # Outcome Date GA Lbr Karthik/2nd Weight Sex Type Anes PTL Lv   3 Current            2 SAB 23 5w0d             Birth Comments: anembryonic gestation/chemical preg   1  16 36w0d  3175 g (7 lb) M Vaginal unsp  Y BRYAN           ROS:   Review of Systems - General ROS: negative  Psychological ROS: negative  Endocrine ROS: negative  Breast ROS: negative  Respiratory ROS: negative  Cardiovascular ROS: negative  Gastrointestinal ROS: negative  Genito-Urinary ROS: negative  Musculoskeletal ROS: negative  Neurological ROS: negative  Dermatological ROS: negative    Objective  Physical Exam:   Vitals:    24 1408   BP: 116/82       Uterine Size: size equals dates  FHT: 110-160 BPM    General appearance - alert, well appearing, and in no distress  Mental status - alert, oriented to person, place, and time  Abdomen- Soft, Gravid uterus, non-tender to palpation  Musculoskeletal: negative for - gait disturbance or joint pain  Extremeties: negative swelling or cyanosis   Dermatological: negative rashes or skin lesions   1/60%/-3/soft/posterior     Assessment/Plan:   Diagnoses and all orders for this visit:    1. Antepartum multigravida of advanced maternal age (Primary)  -     POC Urinalysis Dipstick    2. Previous  delivery,  antepartum            Counseling:   OB precautions, leaking, VB, vito mauricio vs PTL/Labor  [facility]  HTN precautions reviewed: HA, vision change, RUQ/epigastric pain, edema  Round Ligament Pain:  The uterus has several ligaments which provide support and keep the uterus in place. As the  uterus grows these ligaments are pulled and stretched which often causes sharp stabbing like pain in the inguinal area.   You may find a pregnancy support band helpful. Changing positions may also help. Yoga is a great way to cope with round ligament and low back pain in pregnancy.    Massage may also help with low back pain   Things to Consider at this Point in your Pregnancy:  Some women experience swelling in their feet during pregnancy. Compression stockings may help  Drink plenty of water and stay active   Make sure you are eating frequent small meals, nuts are a wonderful snack to keep with you            Return in about 1 week (around 12/23/2024) for Routine OB visit.      We have gone over prenatal care to include the timing and content of visits. I informed her how to contact the office and/or on call person in the event of any problems and encouraged her to do so when she feels it is necessary.  We then spent time answering her questions which she indicated were answered to her satisfaction.    Kimberly See,   12/16/2024 15:48 EST

## 2024-12-20 ENCOUNTER — HOSPITAL ENCOUNTER (OUTPATIENT)
Facility: HOSPITAL | Age: 34
Discharge: HOME | End: 2024-12-20
Attending: OBSTETRICS & GYNECOLOGY | Admitting: OBSTETRICS & GYNECOLOGY
Payer: COMMERCIAL

## 2024-12-20 VITALS
SYSTOLIC BLOOD PRESSURE: 110 MMHG | OXYGEN SATURATION: 99 % | RESPIRATION RATE: 18 BRPM | HEART RATE: 73 BPM | DIASTOLIC BLOOD PRESSURE: 71 MMHG

## 2024-12-20 PROCEDURE — G0463 HOSPITAL OUTPT CLINIC VISIT: HCPCS

## 2024-12-20 PROCEDURE — 59025 FETAL NON-STRESS TEST: CPT

## 2024-12-20 NOTE — NON STRESS TEST
Obstetrical Non-stress Test Interpretation     Name:  Brittani Rosas  MRN: [id number]    34 y.o. female  at 36w5d    Indication: NST/AMA      Fetal Assessment  Fetal Movement: active  Fetal HR Assessment Method: external  Fetal HR (beats/min): 140  Fetal HR Baseline: normal range  Fetal HR Variability: moderate (amplitude range 6 to 25 bpm)  Fetal HR Accelerations: lasting at least 15 seconds  Fetal HR Decelerations: absent    /71 (BP Location: Right arm, Patient Position: Lying)   Pulse 73   Resp 18   LMP 2024   SpO2 99%     Reason for test: OB Triage (NST/AMA)  Date of Test: 2024  Time frame of test:   RN NST Interpretation: Reactive      Julianne Gutierrez RN  2024  08:28 EST

## 2024-12-26 ENCOUNTER — ROUTINE PRENATAL (OUTPATIENT)
Dept: OBSTETRICS AND GYNECOLOGY | Facility: CLINIC | Age: 34
End: 2024-12-26
Payer: COMMERCIAL

## 2024-12-26 VITALS — DIASTOLIC BLOOD PRESSURE: 80 MMHG | WEIGHT: 172 LBS | SYSTOLIC BLOOD PRESSURE: 126 MMHG | BODY MASS INDEX: 31.46 KG/M2

## 2024-12-26 DIAGNOSIS — O09.529 ANTEPARTUM MULTIGRAVIDA OF ADVANCED MATERNAL AGE: Primary | ICD-10-CM

## 2024-12-26 DIAGNOSIS — O09.219 PREVIOUS PRETERM DELIVERY, ANTEPARTUM: ICD-10-CM

## 2024-12-26 DIAGNOSIS — O26.849 UTERINE SIZE-DATE DISCREPANCY, ANTEPARTUM: ICD-10-CM

## 2024-12-26 LAB
GLUCOSE UR STRIP-MCNC: NEGATIVE MG/DL
PROT UR STRIP-MCNC: NEGATIVE MG/DL

## 2024-12-26 NOTE — PROGRESS NOTES
Routine Prenatal Visit     Subjective  Brittani Rosas is a 34 y.o.  at 37w4d here for her routine OB visit.   She is taking her prenatal vitamins.Reports no loss of fluid or vaginal bleeding. Patient doing well without any complaints. Pregnancy complicated by:     Patient Active Problem List   Diagnosis    PVC (premature ventricular contraction)    Antepartum multigravida of advanced maternal age    Supervision of other normal pregnancy, antepartum    Previous  delivery, antepartum    Abnormal glucose in pregnancy, antepartum         OB History    Para Term  AB Living   3 1   1 1 1   SAB IAB Ectopic Molar Multiple Live Births   1         1      # Outcome Date GA Lbr Karthik/2nd Weight Sex Type Anes PTL Lv   3 Current            2 SAB 23 5w0d             Birth Comments: anembryonic gestation/chemical preg   1  16 36w0d  3175 g (7 lb) M Vaginal unsp  Y BRYAN           ROS:   Review of Systems - General ROS: negative  Psychological ROS: negative  Endocrine ROS: negative  Breast ROS: negative  Respiratory ROS: negative  Cardiovascular ROS: negative  Gastrointestinal ROS: negative  Genito-Urinary ROS: negative  Musculoskeletal ROS: negative  Neurological ROS: negative  Dermatological ROS: negative    Objective  Physical Exam:   Vitals:    24 0856   BP: 126/80       Uterine Size: size equals dates  FHT: 110-160 BPM    General appearance - alert, well appearing, and in no distress  Mental status - alert, oriented to person, place, and time  Abdomen- Soft, Gravid uterus, non-tender to palpation  Musculoskeletal: negative for - gait disturbance or joint pain  Extremeties: negative swelling or cyanosis   Dermatological: negative rashes or skin lesions   2cm/60%/-3/mid/soft    Assessment/Plan:   Diagnoses and all orders for this visit:    1. Antepartum multigravida of advanced maternal age (Primary)  -     POC Urinalysis Dipstick    2. Previous  delivery,  antepartum    3. Uterine size-date discrepancy, antepartum            Counseling:   OB precautions, leaking, VB, vito mauricio vs PTL/Labor  [facility]  HTN precautions reviewed: HA, vision change, RUQ/epigastric pain, edema  Round Ligament Pain:  The uterus has several ligaments which provide support and keep the uterus in place. As the  uterus grows these ligaments are pulled and stretched which often causes sharp stabbing like pain in the inguinal area.   You may find a pregnancy support band helpful. Changing positions may also help. Yoga is a great way to cope with round ligament and low back pain in pregnancy.    Massage may also help with low back pain   Things to Consider at this Point in your Pregnancy:  Some women experience swelling in their feet during pregnancy. Compression stockings may help  Drink plenty of water and stay active   Make sure you are eating frequent small meals, nuts are a wonderful snack to keep with you            Return in about 1 week (around 1/2/2025) for Routine OB visit.      We have gone over prenatal care to include the timing and content of visits. I informed her how to contact the office and/or on call person in the event of any problems and encouraged her to do so when she feels it is necessary.  We then spent time answering her questions which she indicated were answered to her satisfaction.    Kimberly See DO  12/26/2024 09:12 EST

## 2024-12-27 ENCOUNTER — HOSPITAL ENCOUNTER (OUTPATIENT)
Facility: HOSPITAL | Age: 34
Discharge: HOME | End: 2024-12-27
Attending: OBSTETRICS & GYNECOLOGY | Admitting: OBSTETRICS & GYNECOLOGY
Payer: COMMERCIAL

## 2024-12-27 ENCOUNTER — HOSPITAL ENCOUNTER (OUTPATIENT)
Dept: LABOR AND DELIVERY | Facility: HOSPITAL | Age: 34
Discharge: HOME | End: 2024-12-27
Payer: COMMERCIAL

## 2024-12-27 VITALS
SYSTOLIC BLOOD PRESSURE: 113 MMHG | HEART RATE: 66 BPM | TEMPERATURE: 98.1 F | RESPIRATION RATE: 18 BRPM | DIASTOLIC BLOOD PRESSURE: 70 MMHG

## 2024-12-27 PROCEDURE — 59025 FETAL NON-STRESS TEST: CPT

## 2024-12-27 NOTE — NON STRESS TEST
Obstetrical Non-stress Test Interpretation     Name:  Brittani Rosas  MRN: [id number]    34 y.o. female  at 37w5d    Indication: AMA      Fetal Assessment  Fetal Movement: active  Fetal HR Assessment Method: external  Fetal HR (beats/min): 135  Fetal HR Baseline: normal range  Fetal HR Variability: moderate (amplitude range 6 to 25 bpm)  Fetal HR Accelerations: greater than/equal to 15 bpm, lasting at least 15 seconds  Fetal HR Decelerations: absent  Sinusoidal Pattern Present: absent    /70 (BP Location: Left arm, Patient Position: Sitting)   Pulse 66   Temp 98.1 °F (36.7 °C) (Oral)   Resp 18   LMP 2024     Reason for test: Other (Comment) (AMA)  Date of Test: 2024  Time frame of test: 9875-2567  RN NST Interpretation: Alivia Rivas RN  2024  10:16 EST

## 2024-12-28 NOTE — NON STRESS TEST
Obstetrical Non-stress Test Interpretation     Name:  Brittani Rosas  MRN: [id number]    34 y.o. female  at 37w5d    Indication: Advanced maternal age      Baseline: Normal 110-160 bpm  Variability:   Moderate/Normal (amplitude 6-25 bpm)  Accelerations: Present (32 weeks+) 15 x 15 bpm  Decelerations: Absent   Contractions:  Absent       Impression:  Category I       Diana Lopez MD  2024  01:20 EST

## 2024-12-31 ENCOUNTER — HOSPITAL ENCOUNTER (OUTPATIENT)
Facility: HOSPITAL | Age: 34
Discharge: HOME | End: 2024-12-31
Attending: OBSTETRICS & GYNECOLOGY | Admitting: OBSTETRICS & GYNECOLOGY
Payer: COMMERCIAL

## 2024-12-31 VITALS — SYSTOLIC BLOOD PRESSURE: 114 MMHG | HEART RATE: 92 BPM | DIASTOLIC BLOOD PRESSURE: 76 MMHG

## 2024-12-31 PROCEDURE — G0463 HOSPITAL OUTPT CLINIC VISIT: HCPCS

## 2024-12-31 PROCEDURE — 59025 FETAL NON-STRESS TEST: CPT

## 2024-12-31 NOTE — NON STRESS TEST
Obstetrical Non-stress Test Interpretation     Name:  Brittani Rosas  MRN: [id number]    34 y.o. female  at 38w2d    Indication: contractions       Fetal Assessment  Fetal Movement: active  Fetal HR Assessment Method: external  Fetal HR (beats/min): 140  Fetal HR Baseline: normal range  Fetal HR Variability: moderate (amplitude range 6 to 25 bpm)  Fetal HR Accelerations: greater than/equal to 15 bpm, lasting at least 15 seconds  Fetal HR Decelerations: absent  Sinusoidal Pattern Present: absent    /76 (BP Location: Right arm, Patient Position: Sitting)   Pulse 92   LMP 2024     Reason for test:    Date of Test: 2024  Time frame of test: 6033-3335  RN NST Interpretation:        Arleen Crowder RN  2024  15:34 EST

## 2024-12-31 NOTE — OBED NOTES
[facility]  Obstetric History and Physical    Chief Complaint   Patient presents with    Contractions       Subjective     Patient is a 34 y.o. female  currently at 38w2d, who presents with complaint of contractions.  She denies any loss of fluid or vaginal bleeding.  Positive fetal movement    PNC provided by:  [facility]. Her prenatal care is complicated by advanced maternal age.  Her previous obstetric/gynecological history is noted for is non-contributory.    The following portions of the patients history were reviewed and updated as appropriate: current medications, allergies, past medical history, past surgical history, past family history, past social history, and problem list .       Prenatal Information:  Prenatal Results       Initial Prenatal Labs       Test Value Reference Range Date Time    Hemoglobin  12.0 g/dL 12.0 - 15.9 24 1017       12.2 g/dL 12.0 - 15.9 24 1525       12.3 g/dL 12.0 - 15.9 24 1114    Hematocrit  36.3 % 34.0 - 46.6 24 1017       37.3 % 34.0 - 46.6 24 1525       36.3 % 34.0 - 46.6 24 1114    Platelets  376 10*3/mm3 140 - 450 24 1017       400 10*3/mm3 140 - 450 24 1525       322 10*3/mm3 140 - 450 24 1114    Rubella IgG  1.05 index Immune >0.99 24 1017    Hepatitis B SAg  Non-Reactive  Non-Reactive 24 1017    Hepatitis C Ab  Non-Reactive  Non-Reactive 24 1017    RPR        T. Pallidum Ab   Non-Reactive  Non-Reactive 24 1611       Non-Reactive  Non-Reactive 24 1017    ABO  A   24 1017    Rh  Positive   24 1017    Antibody Screen  Negative   24 1017    HIV  Non-Reactive  Non-Reactive 24 1017    Urine Culture  25,000 CFU/mL Normal Urogenital Valarie   12/10/24 0845       50,000 CFU/mL Mixed Valarie Isolated   24 1312       25,000 CFU/mL Mixed Valarie Isolated   24 1606       No growth   24 0900    Gonorrhea  Negative  Negative 24 1017    Chlamydia  Negative   Negative 06/18/24 1017    TSH        HgB A1c         Varicella IgG        Hemoglobinopathy Fractionation  Comment   06/18/24 1017    Hemoglobinopathy (genetic testing)        Cystic fibrosis         Spinal muscular atrophy        Fragile X                  Fetal testing        Test Value Reference Range Date Time    NIPT        MSAFP        AFP-4                  2nd and 3rd Trimester       Test Value Reference Range Date Time    Hemoglobin (repeated)  12.6 g/dL 12.0 - 15.9 12/10/24 1419       11.5 g/dL 12.0 - 15.9 09/23/24 1611    Hematocrit (repeated)  36.9 % 34.0 - 46.6 12/10/24 1419       35.6 % 34.0 - 46.6 09/23/24 1611    Platelets   299 10*3/mm3 140 - 450 12/10/24 1419       338 10*3/mm3 140 - 450 09/23/24 1611       376 10*3/mm3 140 - 450 06/18/24 1017       400 10*3/mm3 140 - 450 06/13/24 1525       322 10*3/mm3 140 - 450 06/11/24 1114    1 hour GTT   154 mg/dL 74 - 180 09/27/24 0912       167 mg/dL 65 - 139 09/23/24 1611    Antibody Screen (repeated)        3rd TM syphilis scrn (repeated)  RPR         3rd TM syphilis scrn (repeated) TP-Ab        3rd TM syphilis screen TB-Ab (FTA)        Syphilis cascade test TP-Ab (EIA)        Syphilis cascade TPPA        GTT Fasting        GTT 1 Hr        GTT 2 Hr        GTT 3 Hr  129 mg/dL 74 - 140 09/27/24 1108    Group B Strep  Positive  Negative 12/10/24 0845              Other testing        Test Value Reference Range Date Time    Parvo IgG         CMV IgG                   Drug Screening       Test Value Reference Range Date Time    Amphetamine Screen        Barbiturate Screen  Negative  Negative 07/01/24 0900    Benzodiazepine Screen  Negative  Negative 07/01/24 0900    Methadone Screen  Negative  Negative 07/01/24 0900    Phencyclidine Screen        Opiates Screen  Negative  Negative 07/01/24 0900    THC Screen  Negative  Negative 07/01/24 0900    Cocaine Screen  Negative  Negative 07/01/24 0900    Propoxyphene Screen        Buprenorphine Screen         Methamphetamine Screen        Oxycodone Screen  Negative  Negative 07/01/24 0900    Tricyclic Antidepressants Screen                  Legend    ^: Historical                          External Prenatal Results       Pregnancy Outside Results - Transcribed From Office Records - See Scanned Records For Details       Test Value Date Time    ABO  A  06/18/24 1017    Rh  Positive  06/18/24 1017    Antibody Screen  Negative  06/18/24 1017    Varicella IgG       Rubella  1.05 index 06/18/24 1017    Hgb  12.6 g/dL 12/10/24 1419       11.5 g/dL 09/23/24 1611       12.0 g/dL 06/18/24 1017       12.2 g/dL 06/13/24 1525       12.3 g/dL 06/11/24 1114    Hct  36.9 % 12/10/24 1419       35.6 % 09/23/24 1611       36.3 % 06/18/24 1017       37.3 % 06/13/24 1525       36.3 % 06/11/24 1114    HgB A1c        1h GTT  154 mg/dL 09/27/24 0912       167 mg/dL 09/23/24 1611    3h GTT Fasting       3h GTT 1 hour       3h GTT 2 hour       3h GTT 3 hour   129 mg/dL 09/27/24 1108    Gonorrhea (discrete)  Negative  06/18/24 1017    Chlamydia (discrete)  Negative  06/18/24 1017    RPR       Syphils cascade: TP-Ab (FTA)  Non-Reactive  09/23/24 1611       Non-Reactive  06/18/24 1017    TP-Ab  Non-Reactive  09/23/24 1611       Non-Reactive  06/18/24 1017    TP-Ab (EIA)       TPPA       HBsAg  Non-Reactive  06/18/24 1017    Herpes Simplex Virus PCR       Herpes Simplex VIrus Culture       HIV  Non-Reactive  06/18/24 1017    Hep C RNA Quant PCR       Hep C Antibody  Non-Reactive  06/18/24 1017    AFP       NIPT       Cystic Fibrosis (Tricia)       Cystic Fibroisis        Spinal Muscular atrophy       Fragile X       Group B Strep  Positive  12/10/24 0845    GBS Susceptibility to Clindamycin       GBS Susceptibility to Erythromycin       Fetal Fibronectin       Genetic Testing, Maternal Blood                 Drug Screening       Test Value Date Time    Urine Drug Screen       Amphetamine Screen       Barbiturate Screen  Negative  07/01/24 0900     Benzodiazepine Screen  Negative  24 0900    Methadone Screen  Negative  24 0900    Phencyclidine Screen       Opiates Screen  Negative  24 0900    THC Screen  Negative  24 0900    Cocaine Screen       Propoxyphene Screen       Buprenorphine Screen       Methamphetamine Screen       Oxycodone Screen  Negative  24 0900    Tricyclic Antidepressants Screen                 Legend    ^: Historical                             Past OB History:     OB History    Para Term  AB Living   3 1 0 1 1 1   SAB IAB Ectopic Molar Multiple Live Births   1 0 0 0 0 1      # Outcome Date GA Lbr Karthik/2nd Weight Sex Type Anes PTL Lv   3 Current            2 SAB 23 5w0d             Birth Comments: anembryonic gestation/chemical preg   1  16 36w0d  3175 g (7 lb) M Vaginal unsp  Y BRYAN       Past Medical History: Past Medical History:   Diagnosis Date    Abnormal ECG     PVC's in previous pregnancy    Acne     Allergies     Anemia     Anxiety     Depression     Facial aging     Female infertility     History of cold sores     PONV (postoperative nausea and vomiting)     From wisdom teeth removal      Past Surgical History Past Surgical History:   Procedure Laterality Date    WISDOM TOOTH EXTRACTION        Family History: Family History   Problem Relation Age of Onset    Endometriosis Mother     Breast cancer Paternal Aunt     Heart disease Other     Uterine cancer Neg Hx     Ovarian cancer Neg Hx     Colon cancer Neg Hx     Pulmonary embolism Neg Hx     Deep vein thrombosis Neg Hx     Alcohol abuse Neg Hx     Arthritis Neg Hx     Asthma Neg Hx     Birth defects Neg Hx     Bleeding Disorder Neg Hx     Cancer Neg Hx     COPD Neg Hx     Depression Neg Hx     Drug abuse Neg Hx     Diabetes Neg Hx     Hearing loss Neg Hx     Early death Neg Hx     Hyperlipidemia Neg Hx     Hypertension Neg Hx     Kidney disease Neg Hx     Learning disabilities Neg Hx     Malig Hyperthermia Neg Hx      Mental illness Neg Hx     Mental retardation Neg Hx     Miscarriages / Stillbirths Neg Hx       Social History:  reports that she has never smoked. She has never used smokeless tobacco.   reports that she does not currently use alcohol.   reports no history of drug use.        General ROS: Pertinent items are noted in HPI    Objective       Vital Signs Range for the last 24 hours  Temperature:     Temp Source:     BP: BP: (114)/(76) 114/76   Pulse: Heart Rate:  [92] 92   Respirations:     SPO2:     O2 Amount (l/min):     O2 Devices     Weight:       Physical Examination: General appearance - alert, well appearing, and in no distress  Mental status - alert, oriented to person, place, and time  Chest - clear to auscultation, no wheezes, rales or rhonchi, symmetric air entry  Heart - normal rate, regular rhythm, normal S1, S2, no murmurs, rubs, clicks or gallops  Abdomen - soft, nontender, gravid  Extremities - peripheral pulses normal, no pedal edema, no clubbing or cyanosis  Skin - normal coloration and turgor, no rashes, no suspicious skin lesions noted    Presentation: Vertex   Cervix: Exam by: Method: sterile vaginal exam performed   Dilation: Cervical Dilation (cm): 2   Effacement: Cervical Effacement: 60   Station:         Fetal Heart Rate Assessment   Method: Fetal HR Assessment Method: external   Beats/min: Fetal HR (beats/min): 140   Baseline: Fetal HR Baseline: normal range   Variability: Fetal HR Variability: moderate (amplitude range 6 to 25 bpm)   Accels: Fetal HR Accelerations: greater than/equal to 15 bpm, lasting at least 15 seconds   Decels: Fetal HR Decelerations: absent         Uterine Assessment   Method: Method: external tocotransducer, palpation   Frequency (min): Contraction Frequency (Minutes):  (occasional)   Ctx Count in 10 min:     Duration:     Intensity: Contraction Intensity: mild by palpation       Nichols Units:       GBS is positive      Assessment & Plan     Contractions your  pregnancy      Assessment:  1.  Intrauterine pregnancy at 38w2d gestation with reactive fetal status.    2.  False labor        Plan:  Discharge home  Fetal kick count  Labor precautions    Casey Corrla MD  12/31/2024  15:54 EST

## 2025-01-02 ENCOUNTER — ROUTINE PRENATAL (OUTPATIENT)
Dept: OBSTETRICS AND GYNECOLOGY | Facility: CLINIC | Age: 35
End: 2025-01-02
Payer: COMMERCIAL

## 2025-01-02 VITALS — DIASTOLIC BLOOD PRESSURE: 88 MMHG | WEIGHT: 175 LBS | SYSTOLIC BLOOD PRESSURE: 126 MMHG | BODY MASS INDEX: 32.01 KG/M2

## 2025-01-02 DIAGNOSIS — O09.219 PREVIOUS PRETERM DELIVERY, ANTEPARTUM: ICD-10-CM

## 2025-01-02 DIAGNOSIS — O09.529 ANTEPARTUM MULTIGRAVIDA OF ADVANCED MATERNAL AGE: Primary | ICD-10-CM

## 2025-01-02 LAB
GLUCOSE UR STRIP-MCNC: NEGATIVE MG/DL
PROT UR STRIP-MCNC: NEGATIVE MG/DL

## 2025-01-02 NOTE — PROGRESS NOTES
Routine Prenatal Visit     Subjective  Brittani Rosas is a 34 y.o.  at 38w4d here for her routine OB visit.   She is taking her prenatal vitamins.Reports no loss of fluid or vaginal bleeding. Patient doing well without any complaints. Pregnancy complicated by:     Patient Active Problem List   Diagnosis    PVC (premature ventricular contraction)    Antepartum multigravida of advanced maternal age    Supervision of other normal pregnancy, antepartum    Previous  delivery, antepartum    Abnormal glucose in pregnancy, antepartum         OB History    Para Term  AB Living   3 1   1 1 1   SAB IAB Ectopic Molar Multiple Live Births   1         1      # Outcome Date GA Lbr Karthik/2nd Weight Sex Type Anes PTL Lv   3 Current            2 SAB 23 5w0d             Birth Comments: anembryonic gestation/chemical preg   1  16 36w0d  3175 g (7 lb) M Vaginal unsp  Y BRYAN           ROS:   Review of Systems - General ROS: negative  Psychological ROS: negative  Endocrine ROS: negative  Breast ROS: negative  Respiratory ROS: negative  Cardiovascular ROS: negative  Gastrointestinal ROS: negative  Genito-Urinary ROS: negative  Musculoskeletal ROS: negative  Neurological ROS: negative  Dermatological ROS: negative    Objective  Physical Exam:   Vitals:    25 1155   BP: 126/88       Uterine Size: size equals dates  FHT: 110-160 BPM    General appearance - alert, well appearing, and in no distress  Mental status - alert, oriented to person, place, and time  Abdomen- Soft, Gravid uterus, non-tender to palpation  Musculoskeletal: negative for - gait disturbance or joint pain  Extremeties: negative swelling or cyanosis   Dermatological: negative rashes or skin lesions   3/60%/-3    Assessment/Plan:   Diagnoses and all orders for this visit:    1. Antepartum multigravida of advanced maternal age (Primary)  Comments:  ASA 81mg daily  Orders:  -     POC Urinalysis Dipstick    2. Previous   delivery, antepartum            Counseling:   OB precautions, leaking, VB, vito mauricio vs PTL/Labor  C  HTN precautions reviewed: HA, vision change, RUQ/epigastric pain, edema  IOL scheduled  IOL reviewed in detail.  R/B/A/SE/E.  All history reviewed and updated.  Pre-IOL exam performed.  Length can be 24-48+hrs.  PLAN: pitocin.  All questions answered.  She desires to proceed as planned.  She understands during early am the OB Hospitalist physicians will manage her labor and deliver prn any emergencies.    Round Ligament Pain:  The uterus has several ligaments which provide support and keep the uterus in place. As the  uterus grows these ligaments are pulled and stretched which often causes sharp stabbing like pain in the inguinal area.   You may find a pregnancy support band helpful. Changing positions may also help. Yoga is a great way to cope with round ligament and low back pain in pregnancy.    Massage may also help with low back pain   Things to Consider at this Point in your Pregnancy:  Some women experience swelling in their feet during pregnancy. Compression stockings may help  Drink plenty of water and stay active   Make sure you are eating frequent small meals, nuts are a wonderful snack to keep with you            Return in about 1 week (around 1/9/2025) for Routine OB visit.      We have gone over prenatal care to include the timing and content of visits. I informed her how to contact the office and/or on call person in the event of any problems and encouraged her to do so when she feels it is necessary.  We then spent time answering her questions which she indicated were answered to her satisfaction.    Kimberly See,   1/2/2025 12:25 EST

## 2025-01-03 ENCOUNTER — HOSPITAL ENCOUNTER (OUTPATIENT)
Dept: LABOR AND DELIVERY | Facility: HOSPITAL | Age: 35
Discharge: HOME | End: 2025-01-03
Payer: COMMERCIAL

## 2025-01-03 ENCOUNTER — HOSPITAL ENCOUNTER (OUTPATIENT)
Facility: HOSPITAL | Age: 35
Discharge: HOME | End: 2025-01-03
Attending: OBSTETRICS & GYNECOLOGY | Admitting: OBSTETRICS & GYNECOLOGY
Payer: COMMERCIAL

## 2025-01-03 VITALS — SYSTOLIC BLOOD PRESSURE: 109 MMHG | HEART RATE: 73 BPM | DIASTOLIC BLOOD PRESSURE: 78 MMHG

## 2025-01-03 PROCEDURE — 59025 FETAL NON-STRESS TEST: CPT

## 2025-01-03 NOTE — NON STRESS TEST
Obstetrical Non-stress Test Interpretation     Name:  Brittani Rosas  MRN: [id number]    34 y.o. female  at 38w5d    Indication: ama      Fetal Assessment  Fetal Movement: active  Fetal HR Assessment Method: external  Fetal HR (beats/min): 140  Fetal HR Baseline: normal range  Fetal HR Variability: moderate (amplitude range 6 to 25 bpm)  Fetal HR Accelerations: greater than/equal to 15 bpm, lasting at least 15 seconds  Fetal HR Decelerations: absent    /78   Pulse 73   LMP 2024     Reason for test: Other (Comment) (ama)  Date of Test: 1/3/2025  Time frame of test: 0249-6466  RN NST Interpretation: Reactive      Nancie Whitehead  1/3/2025  13:29 EST

## 2025-01-07 ENCOUNTER — TELEPHONE (OUTPATIENT)
Dept: OBSTETRICS AND GYNECOLOGY | Facility: CLINIC | Age: 35
End: 2025-01-07
Payer: COMMERCIAL

## 2025-01-09 ENCOUNTER — ROUTINE PRENATAL (OUTPATIENT)
Dept: OBSTETRICS AND GYNECOLOGY | Facility: CLINIC | Age: 35
End: 2025-01-09
Payer: COMMERCIAL

## 2025-01-09 ENCOUNTER — PREP FOR SURGERY (OUTPATIENT)
Dept: OTHER | Facility: HOSPITAL | Age: 35
End: 2025-01-09
Payer: COMMERCIAL

## 2025-01-09 VITALS — WEIGHT: 175 LBS | BODY MASS INDEX: 32.01 KG/M2 | SYSTOLIC BLOOD PRESSURE: 128 MMHG | DIASTOLIC BLOOD PRESSURE: 84 MMHG

## 2025-01-09 DIAGNOSIS — O09.529 ANTEPARTUM MULTIGRAVIDA OF ADVANCED MATERNAL AGE: Primary | ICD-10-CM

## 2025-01-09 DIAGNOSIS — O09.219 PREVIOUS PRETERM DELIVERY, ANTEPARTUM: ICD-10-CM

## 2025-01-09 LAB
GLUCOSE UR STRIP-MCNC: NEGATIVE MG/DL
PROT UR STRIP-MCNC: NEGATIVE MG/DL

## 2025-01-09 RX ORDER — METOCLOPRAMIDE HYDROCHLORIDE 5 MG/ML
10 INJECTION INTRAMUSCULAR; INTRAVENOUS ONCE AS NEEDED
Status: CANCELLED | OUTPATIENT
Start: 2025-01-09

## 2025-01-09 RX ORDER — FAMOTIDINE 10 MG/ML
20 INJECTION, SOLUTION INTRAVENOUS 2 TIMES DAILY PRN
Status: CANCELLED | OUTPATIENT
Start: 2025-01-09

## 2025-01-09 RX ORDER — FAMOTIDINE 20 MG/1
20 TABLET, FILM COATED ORAL 2 TIMES DAILY PRN
Status: CANCELLED | OUTPATIENT
Start: 2025-01-09

## 2025-01-09 RX ORDER — SODIUM CHLORIDE, SODIUM LACTATE, POTASSIUM CHLORIDE, CALCIUM CHLORIDE 600; 310; 30; 20 MG/100ML; MG/100ML; MG/100ML; MG/100ML
125 INJECTION, SOLUTION INTRAVENOUS CONTINUOUS
Status: CANCELLED | OUTPATIENT
Start: 2025-01-09 | End: 2025-01-10

## 2025-01-09 RX ORDER — IBUPROFEN 800 MG/1
800 TABLET, FILM COATED ORAL ONCE AS NEEDED
Status: CANCELLED | OUTPATIENT
Start: 2025-01-09

## 2025-01-09 RX ORDER — OXYTOCIN/0.9 % SODIUM CHLORIDE 30/500 ML
250 PLASTIC BAG, INJECTION (ML) INTRAVENOUS CONTINUOUS
Status: CANCELLED | OUTPATIENT
Start: 2025-01-09 | End: 2025-01-09

## 2025-01-09 RX ORDER — HYDROCODONE BITARTRATE AND ACETAMINOPHEN 5; 325 MG/1; MG/1
1 TABLET ORAL EVERY 4 HOURS PRN
Status: CANCELLED | OUTPATIENT
Start: 2025-01-09 | End: 2025-01-16

## 2025-01-09 RX ORDER — ONDANSETRON 2 MG/ML
4 INJECTION INTRAMUSCULAR; INTRAVENOUS EVERY 6 HOURS PRN
Status: CANCELLED | OUTPATIENT
Start: 2025-01-09

## 2025-01-09 RX ORDER — FAMOTIDINE 10 MG/ML
20 INJECTION, SOLUTION INTRAVENOUS ONCE AS NEEDED
Status: CANCELLED | OUTPATIENT
Start: 2025-01-09

## 2025-01-09 RX ORDER — MISOPROSTOL 200 UG/1
800 TABLET ORAL AS NEEDED
Status: CANCELLED | OUTPATIENT
Start: 2025-01-09

## 2025-01-09 RX ORDER — ONDANSETRON 4 MG/1
4 TABLET, ORALLY DISINTEGRATING ORAL EVERY 6 HOURS PRN
Status: CANCELLED | OUTPATIENT
Start: 2025-01-09

## 2025-01-09 RX ORDER — LIDOCAINE HYDROCHLORIDE 10 MG/ML
0.5 INJECTION, SOLUTION EPIDURAL; INFILTRATION; INTRACAUDAL; PERINEURAL ONCE AS NEEDED
Status: CANCELLED | OUTPATIENT
Start: 2025-01-09

## 2025-01-09 RX ORDER — ACETAMINOPHEN 325 MG/1
650 TABLET ORAL EVERY 4 HOURS PRN
Status: CANCELLED | OUTPATIENT
Start: 2025-01-09

## 2025-01-09 RX ORDER — SODIUM CHLORIDE 9 MG/ML
40 INJECTION, SOLUTION INTRAVENOUS AS NEEDED
Status: CANCELLED | OUTPATIENT
Start: 2025-01-09

## 2025-01-09 RX ORDER — PROMETHAZINE HYDROCHLORIDE 25 MG/1
25 TABLET ORAL EVERY 6 HOURS PRN
Status: CANCELLED | OUTPATIENT
Start: 2025-01-09

## 2025-01-09 RX ORDER — SODIUM CHLORIDE 0.9 % (FLUSH) 0.9 %
10 SYRINGE (ML) INJECTION AS NEEDED
Status: CANCELLED | OUTPATIENT
Start: 2025-01-09

## 2025-01-09 RX ORDER — CITRIC ACID/SODIUM CITRATE 334-500MG
30 SOLUTION, ORAL ORAL ONCE AS NEEDED
Status: CANCELLED | OUTPATIENT
Start: 2025-01-09

## 2025-01-09 RX ORDER — METHYLERGONOVINE MALEATE 0.2 MG/ML
200 INJECTION INTRAVENOUS ONCE AS NEEDED
Status: CANCELLED | OUTPATIENT
Start: 2025-01-09

## 2025-01-09 RX ORDER — FAMOTIDINE 20 MG/1
20 TABLET, FILM COATED ORAL ONCE AS NEEDED
Status: CANCELLED | OUTPATIENT
Start: 2025-01-09

## 2025-01-09 RX ORDER — TERBUTALINE SULFATE 1 MG/ML
0.25 INJECTION, SOLUTION SUBCUTANEOUS AS NEEDED
Status: CANCELLED | OUTPATIENT
Start: 2025-01-09

## 2025-01-09 RX ORDER — HYDROCODONE BITARTRATE AND ACETAMINOPHEN 10; 325 MG/1; MG/1
1 TABLET ORAL EVERY 4 HOURS PRN
Status: CANCELLED | OUTPATIENT
Start: 2025-01-09 | End: 2025-01-16

## 2025-01-09 RX ORDER — SODIUM CHLORIDE 0.9 % (FLUSH) 0.9 %
10 SYRINGE (ML) INJECTION EVERY 12 HOURS SCHEDULED
Status: CANCELLED | OUTPATIENT
Start: 2025-01-09

## 2025-01-09 RX ORDER — OXYTOCIN/0.9 % SODIUM CHLORIDE 30/500 ML
2 PLASTIC BAG, INJECTION (ML) INTRAVENOUS
Status: CANCELLED | OUTPATIENT
Start: 2025-01-09

## 2025-01-09 RX ORDER — PROMETHAZINE HYDROCHLORIDE 12.5 MG/1
12.5 TABLET ORAL EVERY 6 HOURS PRN
Status: CANCELLED | OUTPATIENT
Start: 2025-01-09

## 2025-01-09 RX ORDER — ACETAMINOPHEN 325 MG/1
650 TABLET ORAL ONCE AS NEEDED
Status: CANCELLED | OUTPATIENT
Start: 2025-01-09

## 2025-01-09 RX ORDER — OXYTOCIN/0.9 % SODIUM CHLORIDE 30/500 ML
999 PLASTIC BAG, INJECTION (ML) INTRAVENOUS ONCE
Status: CANCELLED | OUTPATIENT
Start: 2025-01-09 | End: 2025-01-09

## 2025-01-09 RX ORDER — MAGNESIUM CARB/ALUMINUM HYDROX 105-160MG
30 TABLET,CHEWABLE ORAL ONCE
Status: CANCELLED | OUTPATIENT
Start: 2025-01-09 | End: 2025-01-09

## 2025-01-09 NOTE — PROGRESS NOTES
Routine Prenatal Visit     Subjective  Brittani Rosas is a 34 y.o.  at 39w4d here for her routine OB visit.   She is taking her prenatal vitamins.Reports no loss of fluid or vaginal bleeding. Patient doing well without any complaints. Pregnancy complicated by:     Patient Active Problem List   Diagnosis    PVC (premature ventricular contraction)    Antepartum multigravida of advanced maternal age    Supervision of other normal pregnancy, antepartum    Previous  delivery, antepartum    Abnormal glucose in pregnancy, antepartum         OB History    Para Term  AB Living   3 1   1 1 1   SAB IAB Ectopic Molar Multiple Live Births   1         1      # Outcome Date GA Lbr Karthik/2nd Weight Sex Type Anes PTL Lv   3 Current            2 SAB 23 5w0d             Birth Comments: anembryonic gestation/chemical preg   1  16 36w0d  3175 g (7 lb) M Vaginal unsp  Y BRYAN           ROS:   Review of Systems - General ROS: negative  Psychological ROS: negative  Endocrine ROS: negative  Breast ROS: negative  Respiratory ROS: negative  Cardiovascular ROS: negative  Gastrointestinal ROS: negative  Genito-Urinary ROS: negative  Musculoskeletal ROS: negative  Neurological ROS: negative  Dermatological ROS: negative    Objective  Physical Exam:   Vitals:    25 1610   BP: 128/84       Uterine Size: size equals dates  FHT: 110-160 BPM    General appearance - alert, well appearing, and in no distress  Mental status - alert, oriented to person, place, and time  Abdomen- Soft, Gravid uterus, non-tender to palpation  Musculoskeletal: negative for - gait disturbance or joint pain  Extremeties: negative swelling or cyanosis   Dermatological: negative rashes or skin lesions       Assessment/Plan:   Diagnoses and all orders for this visit:    1. Antepartum multigravida of advanced maternal age (Primary)  -     POC Urinalysis Dipstick    2. Previous  delivery, antepartum            Counseling:    OB precautions, leaking, VB, vito mauricio vs PTL/Labor  FKC  HTN precautions reviewed: HA, vision change, RUQ/epigastric pain, edema  IOL scheduled  IOL reviewed in detail.  R/B/A/SE/E.  All history reviewed and updated.  Pre-IOL exam performed.  Length can be 24-48+hrs.  PLAN: pitocin.  All questions answered.  She desires to proceed as planned.  She understands during early am the OB Hospitalist physicians will manage her labor and deliver prn any emergencies.    Round Ligament Pain:  The uterus has several ligaments which provide support and keep the uterus in place. As the  uterus grows these ligaments are pulled and stretched which often causes sharp stabbing like pain in the inguinal area.   You may find a pregnancy support band helpful. Changing positions may also help. Yoga is a great way to cope with round ligament and low back pain in pregnancy.    Massage may also help with low back pain   Things to Consider at this Point in your Pregnancy:  Some women experience swelling in their feet during pregnancy. Compression stockings may help  Drink plenty of water and stay active   Make sure you are eating frequent small meals, nuts are a wonderful snack to keep with you            Return in about 4 weeks (around 2/6/2025) for Postpartum visit.      We have gone over prenatal care to include the timing and content of visits. I informed her how to contact the office and/or on call person in the event of any problems and encouraged her to do so when she feels it is necessary.  We then spent time answering her questions which she indicated were answered to her satisfaction.    Kimberly See DO  1/9/2025 16:51 EST

## 2025-01-10 ENCOUNTER — HOSPITAL ENCOUNTER (OUTPATIENT)
Dept: LABOR AND DELIVERY | Facility: HOSPITAL | Age: 35
Setting detail: OBSERVATION
Discharge: HOME | End: 2025-01-10
Payer: COMMERCIAL

## 2025-01-10 ENCOUNTER — ANESTHESIA EVENT (OUTPATIENT)
Dept: LABOR AND DELIVERY | Facility: HOSPITAL | Age: 35
End: 2025-01-10
Payer: COMMERCIAL

## 2025-01-10 ENCOUNTER — HOSPITAL ENCOUNTER (INPATIENT)
Facility: HOSPITAL | Age: 35
LOS: 2 days | Discharge: HOME | End: 2025-01-12
Attending: OBSTETRICS & GYNECOLOGY | Admitting: OBSTETRICS & GYNECOLOGY
Payer: COMMERCIAL

## 2025-01-10 ENCOUNTER — ANESTHESIA (OUTPATIENT)
Dept: LABOR AND DELIVERY | Facility: HOSPITAL | Age: 35
End: 2025-01-10
Payer: COMMERCIAL

## 2025-01-10 PROBLEM — Z34.90 ENCOUNTER FOR INDUCTION OF LABOR: Status: ACTIVE | Noted: 2025-01-10

## 2025-01-10 LAB
ABO GROUP BLD: NORMAL
BLD GP AB SCN SERPL QL: NEGATIVE
DEPRECATED RDW RBC AUTO: 45.1 FL (ref 37–54)
ERYTHROCYTE [DISTWIDTH] IN BLOOD BY AUTOMATED COUNT: 14.6 % (ref 12.3–15.4)
HCT VFR BLD AUTO: 37.9 % (ref 34–46.6)
HGB BLD-MCNC: 12.3 G/DL (ref 12–15.9)
MCH RBC QN AUTO: 27.5 PG (ref 26.6–33)
MCHC RBC AUTO-ENTMCNC: 32.5 G/DL (ref 31.5–35.7)
MCV RBC AUTO: 84.8 FL (ref 79–97)
PLATELET # BLD AUTO: 281 10*3/MM3 (ref 140–450)
PMV BLD AUTO: 10.2 FL (ref 6–12)
RBC # BLD AUTO: 4.47 10*6/MM3 (ref 3.77–5.28)
RH BLD: POSITIVE
T&S EXPIRATION DATE: NORMAL
TREPONEMA PALLIDUM IGG+IGM AB [PRESENCE] IN SERUM OR PLASMA BY IMMUNOASSAY: NORMAL
WBC NRBC COR # BLD AUTO: 10.85 10*3/MM3 (ref 3.4–10.8)

## 2025-01-10 PROCEDURE — 86850 RBC ANTIBODY SCREEN: CPT | Performed by: OBSTETRICS & GYNECOLOGY

## 2025-01-10 PROCEDURE — 86901 BLOOD TYPING SEROLOGIC RH(D): CPT | Performed by: OBSTETRICS & GYNECOLOGY

## 2025-01-10 PROCEDURE — 59400 OBSTETRICAL CARE: CPT | Performed by: OBSTETRICS & GYNECOLOGY

## 2025-01-10 PROCEDURE — 25010000002 PENICILLIN G POTASSIUM PER 600000 UNITS: Performed by: OBSTETRICS & GYNECOLOGY

## 2025-01-10 PROCEDURE — 85027 COMPLETE CBC AUTOMATED: CPT | Performed by: OBSTETRICS & GYNECOLOGY

## 2025-01-10 PROCEDURE — 0HQ9XZZ REPAIR PERINEUM SKIN, EXTERNAL APPROACH: ICD-10-PCS | Performed by: OBSTETRICS & GYNECOLOGY

## 2025-01-10 PROCEDURE — 25010000002 OXYTOCIN PER 10 UNITS: Performed by: OBSTETRICS & GYNECOLOGY

## 2025-01-10 PROCEDURE — 25010000002 ROPIVACAINE PER 1 MG: Performed by: NURSE ANESTHETIST, CERTIFIED REGISTERED

## 2025-01-10 PROCEDURE — 25810000003 SODIUM CHLORIDE 0.9 % SOLUTION: Performed by: OBSTETRICS & GYNECOLOGY

## 2025-01-10 PROCEDURE — 25810000003 LACTATED RINGERS PER 1000 ML: Performed by: OBSTETRICS & GYNECOLOGY

## 2025-01-10 PROCEDURE — 59025 FETAL NON-STRESS TEST: CPT

## 2025-01-10 PROCEDURE — 86900 BLOOD TYPING SEROLOGIC ABO: CPT | Performed by: OBSTETRICS & GYNECOLOGY

## 2025-01-10 PROCEDURE — C1755 CATHETER, INTRASPINAL: HCPCS | Performed by: REGISTERED NURSE

## 2025-01-10 PROCEDURE — 25010000002 ONDANSETRON PER 1 MG: Performed by: OBSTETRICS & GYNECOLOGY

## 2025-01-10 PROCEDURE — 51702 INSERT TEMP BLADDER CATH: CPT

## 2025-01-10 PROCEDURE — 86780 TREPONEMA PALLIDUM: CPT | Performed by: OBSTETRICS & GYNECOLOGY

## 2025-01-10 RX ORDER — METOCLOPRAMIDE HYDROCHLORIDE 5 MG/ML
10 INJECTION INTRAMUSCULAR; INTRAVENOUS ONCE AS NEEDED
Status: DISCONTINUED | OUTPATIENT
Start: 2025-01-10 | End: 2025-01-10 | Stop reason: HOSPADM

## 2025-01-10 RX ORDER — MAGNESIUM CARB/ALUMINUM HYDROX 105-160MG
30 TABLET,CHEWABLE ORAL ONCE
Status: COMPLETED | OUTPATIENT
Start: 2025-01-10 | End: 2025-01-10

## 2025-01-10 RX ORDER — ACETAMINOPHEN 325 MG/1
650 TABLET ORAL ONCE AS NEEDED
Status: COMPLETED | OUTPATIENT
Start: 2025-01-10 | End: 2025-01-10

## 2025-01-10 RX ORDER — TERBUTALINE SULFATE 1 MG/ML
0.25 INJECTION, SOLUTION SUBCUTANEOUS AS NEEDED
Status: DISCONTINUED | OUTPATIENT
Start: 2025-01-10 | End: 2025-01-10 | Stop reason: HOSPADM

## 2025-01-10 RX ORDER — HYDROCODONE BITARTRATE AND ACETAMINOPHEN 10; 325 MG/1; MG/1
1 TABLET ORAL EVERY 4 HOURS PRN
Status: DISCONTINUED | OUTPATIENT
Start: 2025-01-10 | End: 2025-01-10 | Stop reason: HOSPADM

## 2025-01-10 RX ORDER — LIDOCAINE HYDROCHLORIDE AND EPINEPHRINE 15; 5 MG/ML; UG/ML
INJECTION, SOLUTION EPIDURAL
Status: COMPLETED | OUTPATIENT
Start: 2025-01-10 | End: 2025-01-10

## 2025-01-10 RX ORDER — OXYTOCIN/0.9 % SODIUM CHLORIDE 30/500 ML
2 PLASTIC BAG, INJECTION (ML) INTRAVENOUS
Status: DISCONTINUED | OUTPATIENT
Start: 2025-01-10 | End: 2025-01-10 | Stop reason: HOSPADM

## 2025-01-10 RX ORDER — ACETAMINOPHEN 325 MG/1
650 TABLET ORAL EVERY 4 HOURS PRN
Status: DISCONTINUED | OUTPATIENT
Start: 2025-01-10 | End: 2025-01-10 | Stop reason: HOSPADM

## 2025-01-10 RX ORDER — SODIUM CHLORIDE, SODIUM LACTATE, POTASSIUM CHLORIDE, CALCIUM CHLORIDE 600; 310; 30; 20 MG/100ML; MG/100ML; MG/100ML; MG/100ML
125 INJECTION, SOLUTION INTRAVENOUS CONTINUOUS
Status: DISCONTINUED | OUTPATIENT
Start: 2025-01-10 | End: 2025-01-10

## 2025-01-10 RX ORDER — PROMETHAZINE HYDROCHLORIDE 25 MG/1
12.5 TABLET ORAL EVERY 4 HOURS PRN
Status: DISCONTINUED | OUTPATIENT
Start: 2025-01-10 | End: 2025-01-12 | Stop reason: HOSPADM

## 2025-01-10 RX ORDER — METHYLERGONOVINE MALEATE 0.2 MG/ML
200 INJECTION INTRAVENOUS ONCE AS NEEDED
Status: DISCONTINUED | OUTPATIENT
Start: 2025-01-10 | End: 2025-01-10 | Stop reason: HOSPADM

## 2025-01-10 RX ORDER — MISOPROSTOL 200 UG/1
800 TABLET ORAL AS NEEDED
Status: DISCONTINUED | OUTPATIENT
Start: 2025-01-10 | End: 2025-01-10 | Stop reason: HOSPADM

## 2025-01-10 RX ORDER — ROPIVACAINE HYDROCHLORIDE 2 MG/ML
INJECTION, SOLUTION EPIDURAL; INFILTRATION; PERINEURAL
Status: COMPLETED | OUTPATIENT
Start: 2025-01-10 | End: 2025-01-10

## 2025-01-10 RX ORDER — OXYTOCIN/0.9 % SODIUM CHLORIDE 30/500 ML
250 PLASTIC BAG, INJECTION (ML) INTRAVENOUS CONTINUOUS
Status: ACTIVE | OUTPATIENT
Start: 2025-01-10 | End: 2025-01-10

## 2025-01-10 RX ORDER — ONDANSETRON 4 MG/1
4 TABLET, ORALLY DISINTEGRATING ORAL EVERY 6 HOURS PRN
Status: DISCONTINUED | OUTPATIENT
Start: 2025-01-10 | End: 2025-01-10 | Stop reason: HOSPADM

## 2025-01-10 RX ORDER — CITRIC ACID/SODIUM CITRATE 334-500MG
30 SOLUTION, ORAL ORAL ONCE AS NEEDED
Status: DISCONTINUED | OUTPATIENT
Start: 2025-01-10 | End: 2025-01-10 | Stop reason: HOSPADM

## 2025-01-10 RX ORDER — PROMETHAZINE HYDROCHLORIDE 25 MG/1
25 TABLET ORAL EVERY 6 HOURS PRN
Status: DISCONTINUED | OUTPATIENT
Start: 2025-01-10 | End: 2025-01-10 | Stop reason: HOSPADM

## 2025-01-10 RX ORDER — OXYTOCIN/0.9 % SODIUM CHLORIDE 30/500 ML
125 PLASTIC BAG, INJECTION (ML) INTRAVENOUS ONCE AS NEEDED
Status: DISCONTINUED | OUTPATIENT
Start: 2025-01-10 | End: 2025-01-12 | Stop reason: HOSPADM

## 2025-01-10 RX ORDER — ONDANSETRON 4 MG/1
4 TABLET, ORALLY DISINTEGRATING ORAL EVERY 8 HOURS PRN
Status: DISCONTINUED | OUTPATIENT
Start: 2025-01-10 | End: 2025-01-12 | Stop reason: HOSPADM

## 2025-01-10 RX ORDER — FAMOTIDINE 10 MG/ML
20 INJECTION, SOLUTION INTRAVENOUS ONCE AS NEEDED
Status: DISCONTINUED | OUTPATIENT
Start: 2025-01-10 | End: 2025-01-10 | Stop reason: HOSPADM

## 2025-01-10 RX ORDER — FAMOTIDINE 10 MG/ML
20 INJECTION, SOLUTION INTRAVENOUS 2 TIMES DAILY PRN
Status: DISCONTINUED | OUTPATIENT
Start: 2025-01-10 | End: 2025-01-10 | Stop reason: HOSPADM

## 2025-01-10 RX ORDER — MORPHINE SULFATE 5 MG/ML
5 INJECTION, SOLUTION INTRAMUSCULAR; INTRAVENOUS
Status: DISCONTINUED | OUTPATIENT
Start: 2025-01-10 | End: 2025-01-10 | Stop reason: HOSPADM

## 2025-01-10 RX ORDER — ONDANSETRON 2 MG/ML
4 INJECTION INTRAMUSCULAR; INTRAVENOUS EVERY 6 HOURS PRN
Status: DISCONTINUED | OUTPATIENT
Start: 2025-01-10 | End: 2025-01-10 | Stop reason: HOSPADM

## 2025-01-10 RX ORDER — IBUPROFEN 600 MG/1
600 TABLET, FILM COATED ORAL EVERY 6 HOURS SCHEDULED
Status: DISCONTINUED | OUTPATIENT
Start: 2025-01-10 | End: 2025-01-12 | Stop reason: HOSPADM

## 2025-01-10 RX ORDER — SODIUM CHLORIDE 0.9 % (FLUSH) 0.9 %
10 SYRINGE (ML) INJECTION AS NEEDED
Status: DISCONTINUED | OUTPATIENT
Start: 2025-01-10 | End: 2025-01-10 | Stop reason: HOSPADM

## 2025-01-10 RX ORDER — DOCUSATE SODIUM 100 MG/1
100 CAPSULE, LIQUID FILLED ORAL DAILY
Status: DISCONTINUED | OUTPATIENT
Start: 2025-01-10 | End: 2025-01-12 | Stop reason: HOSPADM

## 2025-01-10 RX ORDER — LIDOCAINE HYDROCHLORIDE 10 MG/ML
0.5 INJECTION, SOLUTION EPIDURAL; INFILTRATION; INTRACAUDAL; PERINEURAL ONCE AS NEEDED
Status: DISCONTINUED | OUTPATIENT
Start: 2025-01-10 | End: 2025-01-10 | Stop reason: HOSPADM

## 2025-01-10 RX ORDER — PROMETHAZINE HYDROCHLORIDE 25 MG/1
12.5 TABLET ORAL EVERY 6 HOURS PRN
Status: DISCONTINUED | OUTPATIENT
Start: 2025-01-10 | End: 2025-01-10 | Stop reason: HOSPADM

## 2025-01-10 RX ORDER — OXYTOCIN/0.9 % SODIUM CHLORIDE 30/500 ML
999 PLASTIC BAG, INJECTION (ML) INTRAVENOUS ONCE
Status: COMPLETED | OUTPATIENT
Start: 2025-01-10 | End: 2025-01-10

## 2025-01-10 RX ORDER — FAMOTIDINE 20 MG/1
20 TABLET, FILM COATED ORAL 2 TIMES DAILY PRN
Status: DISCONTINUED | OUTPATIENT
Start: 2025-01-10 | End: 2025-01-10 | Stop reason: HOSPADM

## 2025-01-10 RX ORDER — BISACODYL 10 MG
10 SUPPOSITORY, RECTAL RECTAL DAILY PRN
Status: DISCONTINUED | OUTPATIENT
Start: 2025-01-11 | End: 2025-01-12 | Stop reason: HOSPADM

## 2025-01-10 RX ORDER — CALCIUM CARBONATE 500 MG/1
2 TABLET, CHEWABLE ORAL 3 TIMES DAILY PRN
Status: DISCONTINUED | OUTPATIENT
Start: 2025-01-10 | End: 2025-01-12 | Stop reason: HOSPADM

## 2025-01-10 RX ORDER — SODIUM CHLORIDE 0.9 % (FLUSH) 0.9 %
10 SYRINGE (ML) INJECTION EVERY 12 HOURS SCHEDULED
Status: DISCONTINUED | OUTPATIENT
Start: 2025-01-10 | End: 2025-01-10 | Stop reason: HOSPADM

## 2025-01-10 RX ORDER — SODIUM CHLORIDE 0.9 % (FLUSH) 0.9 %
1-10 SYRINGE (ML) INJECTION AS NEEDED
Status: DISCONTINUED | OUTPATIENT
Start: 2025-01-10 | End: 2025-01-12 | Stop reason: HOSPADM

## 2025-01-10 RX ORDER — FAMOTIDINE 20 MG/1
20 TABLET, FILM COATED ORAL ONCE AS NEEDED
Status: DISCONTINUED | OUTPATIENT
Start: 2025-01-10 | End: 2025-01-10 | Stop reason: HOSPADM

## 2025-01-10 RX ORDER — SODIUM CHLORIDE 9 MG/ML
40 INJECTION, SOLUTION INTRAVENOUS AS NEEDED
Status: DISCONTINUED | OUTPATIENT
Start: 2025-01-10 | End: 2025-01-10 | Stop reason: HOSPADM

## 2025-01-10 RX ORDER — EPHEDRINE SULFATE 50 MG/ML
5 INJECTION, SOLUTION INTRAVENOUS
Status: DISCONTINUED | OUTPATIENT
Start: 2025-01-10 | End: 2025-01-10 | Stop reason: HOSPADM

## 2025-01-10 RX ORDER — ACETAMINOPHEN 325 MG/1
650 TABLET ORAL EVERY 6 HOURS PRN
Status: DISCONTINUED | OUTPATIENT
Start: 2025-01-10 | End: 2025-01-12 | Stop reason: HOSPADM

## 2025-01-10 RX ORDER — HYDROCODONE BITARTRATE AND ACETAMINOPHEN 5; 325 MG/1; MG/1
1 TABLET ORAL EVERY 4 HOURS PRN
Status: DISCONTINUED | OUTPATIENT
Start: 2025-01-10 | End: 2025-01-10 | Stop reason: HOSPADM

## 2025-01-10 RX ORDER — IBUPROFEN 800 MG/1
800 TABLET, FILM COATED ORAL ONCE AS NEEDED
Status: DISCONTINUED | OUTPATIENT
Start: 2025-01-10 | End: 2025-01-10 | Stop reason: HOSPADM

## 2025-01-10 RX ADMIN — LIDOCAINE HYDROCHLORIDE AND EPINEPHRINE 3 ML: 15; 5 INJECTION, SOLUTION EPIDURAL at 04:54

## 2025-01-10 RX ADMIN — OXYTOCIN 999 ML/HR: 10 INJECTION, SOLUTION INTRAMUSCULAR; INTRAVENOUS at 10:34

## 2025-01-10 RX ADMIN — Medication 10 ML/HR: at 04:38

## 2025-01-10 RX ADMIN — DOCUSATE SODIUM 100 MG: 100 CAPSULE, LIQUID FILLED ORAL at 14:47

## 2025-01-10 RX ADMIN — SODIUM CHLORIDE, POTASSIUM CHLORIDE, SODIUM LACTATE AND CALCIUM CHLORIDE 125 ML/HR: 600; 310; 30; 20 INJECTION, SOLUTION INTRAVENOUS at 08:04

## 2025-01-10 RX ADMIN — ACETAMINOPHEN 650 MG: 325 TABLET ORAL at 12:44

## 2025-01-10 RX ADMIN — LIDOCAINE HYDROCHLORIDE AND EPINEPHRINE 2 ML: 15; 5 INJECTION, SOLUTION EPIDURAL at 04:48

## 2025-01-10 RX ADMIN — IBUPROFEN 600 MG: 600 TABLET, FILM COATED ORAL at 14:46

## 2025-01-10 RX ADMIN — SODIUM CHLORIDE, POTASSIUM CHLORIDE, SODIUM LACTATE AND CALCIUM CHLORIDE 125 ML/HR: 600; 310; 30; 20 INJECTION, SOLUTION INTRAVENOUS at 04:17

## 2025-01-10 RX ADMIN — BENZOCAINE AND LEVOMENTHOL: 200; 5 SPRAY TOPICAL at 12:24

## 2025-01-10 RX ADMIN — WITCH HAZEL: 500 SOLUTION RECTAL; TOPICAL at 12:24

## 2025-01-10 RX ADMIN — SODIUM CHLORIDE 2.5 MILLION UNITS: 9 INJECTION, SOLUTION INTRAVENOUS at 08:04

## 2025-01-10 RX ADMIN — ROPIVACAINE HYDROCHLORIDE 3 ML: 2 INJECTION, SOLUTION EPIDURAL; INFILTRATION; PERINEURAL at 04:54

## 2025-01-10 RX ADMIN — MINERAL OIL 30 ML: 1000 SOLUTION ORAL at 09:45

## 2025-01-10 RX ADMIN — ONDANSETRON 4 MG: 2 INJECTION INTRAMUSCULAR; INTRAVENOUS at 04:35

## 2025-01-10 RX ADMIN — PENICILLIN G POTASSIUM 5 MILLION UNITS: 5000000 INJECTION, POWDER, FOR SOLUTION INTRAMUSCULAR; INTRAVENOUS at 04:17

## 2025-01-10 NOTE — L&D DELIVERY NOTE
"Chay Rosas [[id number]]      Labor Events    Rupture date/time: 1/10/2025  Rupture type: spontaneous rupture of membranes  Complications: None       Labor Event Times    Dilation complete date/time: 1/10/2025 0932  Start pushing date/time: 1/10/2025 0945       Anesthesia    Method: Epidural        Delivery    Birth date/time: 1/10/2025 10:09:00  Delivery type: Vaginal, Spontaneous  Complications: None       Delivery Providers    Delivering clinician:    Provider Role     Circulator     Baby Nurse     Infant Provider     Anesthesia Provider     OB Tech          Placenta    Placenta delivery date/time: 1/10/2025 1013  Placenta removal: Spontaneous  Placenta appearance: Intact  Placenta disposition: discarded       Resuscitation    Method: Tactile Stimulation, Warmed via Radiant Warmer , Dried , Suctioning       Apgars       Living status: Living      Apgars assigned by: KINZA PEREZ RN          Apgar Component Scores       Component 1 min. 5 min.    Skin color:  0  1     Heart rate:  2  2     Reflex irritability:  2  2     Muscle tone:  2  2     Respiratory effort:  2  2     Total:  8  9                Measurements    Weight: 3530 g  Weight (lbs): 7 lb 12.5 oz  Length: 52.1 cm  Length (in): 20.5\"       Lacerations    Episiotomy: None  Perineal laceration: 1st  Periurethral laceration?: Yes  Vaginal delivery est. blood loss (mL): 200                  VAGINAL DELIVERY NOTE          Date: 1/10/2025  Time:  10:09 AM  GA: 39w5d       Encounter for induction of labor        Infant: male infant, See infant chart for details     APGARS: 8  @ 1 minute / 9  @ 5 minutes    Delivery:  Patient received epidural and progressed to complete. Patient delivered over intact perineum. Infant head delivered in RUSSELL position. Nuchal cord  X 1 and reduced without difficulty. Infant shoulder and body delivered without difficulty. Infant was placed onto mothers abdomen. Delayed cord clamping for 1 minute. Cord was then clamped and " cut. Cord blood obtained and sent.  Placenta delivered spontaneously and was intact, pitocin was started. Vagina was cleared of all clots and inspected for any lacerations. A left periurethral laceration noted, repaired with 3-0 vicryl X. All sponges, needles and instruments were counted and correct.    EBL: 200 ml    Complications: None    Anesthesia:  Epidural    Episiotomy:  none    Lacerations:  periurethral    See Delivery Summary for Apgar and Weight.    Kimberly See DO  1/10/2025 10:20 EST          Electronically signed by Kimberly See DO, 01/10/25, 10:18 AM EST.       [facility]  [address] [place] [address] [place]  [facility] [address]  Dept: [phone]  Dept Fax: [phone]  Loc: [phone]

## 2025-01-10 NOTE — PLAN OF CARE
Goal Outcome Evaluation:           Progress: improving  Outcome Evaluation: pt admitted in labor, progressed to completed, delivered a viable male infant vaginally, up x 1, voided x 1. pain controlled with tylenol, bonding with baby.

## 2025-01-10 NOTE — ANESTHESIA PROCEDURE NOTES
Labor Epidural      Patient reassessed immediately prior to procedure    Patient location during procedure: OB  Start Time: 1/10/2025 4:38 AM  Stop Time: 1/10/2025 4:56 AM  Performed By  CRNA/CAA: Anoop Bowers CRNA  Preanesthetic Checklist  Completed: patient identified, IV checked, risks and benefits discussed, surgical consent, monitors and equipment checked, pre-op evaluation and timeout performed  Prep:  Pt Position:sitting  Sterile Tech:cap, gloves, sterile barrier, mask and gown  Prep:povidone-iodine 7.5% surgical scrub  Monitoring:blood pressure monitoring, continuous pulse oximetry and EKG  Epidural Block Procedure:  Approach:midline  Guidance:landmark technique and palpation technique  Location:L4-L5  Needle Type:Tuohy  Needle Gauge:17 G  Loss of Resistance Medium: saline  Loss of Resistance: 8cm  Cath Depth at skin:14 cm  Paresthesia: none  Aspiration:negative  Test Dose:negative  Medication: ropivacaine (NAROPIN) 0.2 % injection - Injection   3 mL - 1/10/2025 4:54:00 AM  lidocaine 1.5%-EPINEPHrine 1:200,000 (XYLOCAINE W/EPI) injection - Epidural   2 mL - 1/10/2025 4:48:00 AM   3 mL - 1/10/2025 4:54:00 AM  Number of Attempts: 1  Post Assessment:  Dressing:occlusive dressing applied and secured with tape  Pt Tolerance:patient tolerated the procedure well with no apparent complications  Complications:no

## 2025-01-10 NOTE — LACTATION NOTE
LC assisted with 2 feedings this shift. At first feeding after delivery baby was sleepy at the breast and uncoordinated. LC assisted with hand expression and was able to get 1.5 mL that we then syringe fed baby.  Infant was placed skin to skin about one and a half hours later. Infant was still sleepy and uncoordinated and did not achieve latch. LC assisted mom with hospital pump. LC discussed pumping expectations and cleaning guidelines. Mom pumped 15ml and infant took 13 mL in a bottle. LC discussed normal  behavior and feeding expectations. Patient verbalized understanding.

## 2025-01-10 NOTE — H&P
Obstetric History and Physical     Brittani Rosas is a 34 y.o.  at 39w5d weeks EGA.  She presents for spontaneous labor. Patient denies any vaginal bleeding, loss of fluid or decreased fetal movements.  Her ACOG is reviewed and current.    Past Medical History:   Diagnosis Date    Abnormal ECG     PVC's in previous pregnancy    Acne     Allergies     Anemia     Anxiety     Depression     Facial aging     Female infertility     History of cold sores     PONV (postoperative nausea and vomiting)     From wisdom teeth removal       Past Surgical History:   Procedure Laterality Date    WISDOM TOOTH EXTRACTION         Family History   Problem Relation Age of Onset    Endometriosis Mother     Breast cancer Paternal Aunt     Heart disease Other     Uterine cancer Neg Hx     Ovarian cancer Neg Hx     Colon cancer Neg Hx     Pulmonary embolism Neg Hx     Deep vein thrombosis Neg Hx     Alcohol abuse Neg Hx     Arthritis Neg Hx     Asthma Neg Hx     Birth defects Neg Hx     Bleeding Disorder Neg Hx     Cancer Neg Hx     COPD Neg Hx     Depression Neg Hx     Drug abuse Neg Hx     Diabetes Neg Hx     Hearing loss Neg Hx     Early death Neg Hx     Hyperlipidemia Neg Hx     Hypertension Neg Hx     Kidney disease Neg Hx     Learning disabilities Neg Hx     Malig Hyperthermia Neg Hx     Mental illness Neg Hx     Mental retardation Neg Hx     Miscarriages / Stillbirths Neg Hx        Social History     Tobacco Use    Smoking status: Never    Smokeless tobacco: Never   Vaping Use    Vaping status: Never Used   Substance Use Topics    Alcohol use: Not Currently     Comment: LIGHT    Drug use: Never       Prior to Admission Meds: (Last known outpatient meds at time of note signature)  Prior to Admission medications    Medication Sig Start Date End Date Taking? Authorizing Provider   aspirin 81 MG chewable tablet Chew 1 tablet Daily.    Provider, MD José Antonio   Prenatal Vit-Fe Fumarate-FA (prenatal vitamin 27-0.8) 27-0.8 MG  tablet tablet Take  by mouth Daily.    Provider, José Antonio, MD         ROS:    General ROS: negative for - chills or fatigue  Ophthalmic ROS: negative for - blurry vision or decreased vision  ENT ROS: negative for - epistaxis, headaches or hearing change  Allergy and Immunology ROS: negative for - hives or insect bite sensitivity  Hematological and Lymphatic ROS: negative for - bleeding problems or blood clots  Endocrine ROS: negative for - breast changes or galactorrhea  Breast ROS: negative for - galactorrhea or new or changing breast lumps  Respiratory ROS: negative for - cough or hemoptysis  Cardiovascular ROS: negative for - chest pain or dyspnea on exertion  Gastrointestinal ROS: negative for - abdominal pain or appetite loss  Genito-Urinary ROS: negative for - change in urinary stream, dysuria   Musculoskeletal ROS: negative for - gait disturbance or joint pain  Neurological ROS: negative for - behavioral changes or bowel and bladder control changes  Dermatological ROS: negative for acne and dry skin      Vitals:    01/10/25 0505   BP: 134/49   Pulse: 90   Resp:    Temp:    SpO2: 100%       Physical Exam   General- NAD, alert and oriented, appropriate  Psych- Normal mood, good memory  CV- Regular rhythm, no murnurs  Resp- CTA to bases, no wheezes  Abdomen- NABS, soft, non distended, non tender, no masses  Abdomen- Gravid, non tender  Fundus-  Size: consistent w dates.  Non tender.  Cvx- 7-8cm / 50% / -1 per RN  Presentation- VTX  Ext/DTRs- No edema    Fetal HR: Category I  Contractions: Regular    Plans reviewed and discussed with Brittani who is in agreement.        ASSESSMENT:  39w5d spontaneous labor   Labor  GBS: Positive      PLAN:  Admitted to L&D  Delivery: See labor orders, plan for   ANTIBIOTICS for GBS    Plan of care NLT hospital course, R/B/A/potential SE, suspected length 24-48+hrs have been reviewed with patient and any family or friends present, questions answered to her/his/their  satisfaction.  Pt desires to proceed as above.      Kimberly See, DO

## 2025-01-10 NOTE — ANESTHESIA PREPROCEDURE EVALUATION
Anesthesia Evaluation     no history of anesthetic complications:                Airway   Mallampati: I  TM distance: >3 FB  Neck ROM: full  No difficulty expected  Dental      Pulmonary - negative pulmonary ROS and normal exam    breath sounds clear to auscultation  Cardiovascular - normal exam  Exercise tolerance: good (4-7 METS)    Rhythm: regular  Rate: normal        Neuro/Psych- negative ROS  GI/Hepatic/Renal/Endo - negative ROS     Musculoskeletal (-) negative ROS    Abdominal    Substance History - negative use     OB/GYN    (+) Pregnant        Other                      Anesthesia Plan    ASA 2     epidural       Anesthetic plan, risks, benefits, and alternatives have been provided, discussed and informed consent has been obtained with: patient.    CODE STATUS:    Code Status (Patient has no pulse and is not breathing): CPR (Attempt to Resuscitate)  Medical Interventions (Patient has pulse or is breathing): Full

## 2025-01-10 NOTE — PLAN OF CARE
Goal Outcome Evaluation:  Plan of Care Reviewed With: patient           Outcome Evaluation: patient walking independently in room. patient bonding with . patient educated on postpartum care and normal bleeding. pain controlled with heating pad. assessment wnl. vs wnl. fundus firm without massage and 2 below.

## 2025-01-11 RX ORDER — IBUPROFEN 600 MG/1
600 TABLET, FILM COATED ORAL EVERY 6 HOURS SCHEDULED
Qty: 20 TABLET | Refills: 0 | Status: SHIPPED | OUTPATIENT
Start: 2025-01-11

## 2025-01-11 RX ADMIN — IBUPROFEN 600 MG: 600 TABLET, FILM COATED ORAL at 00:12

## 2025-01-11 RX ADMIN — IBUPROFEN 600 MG: 600 TABLET, FILM COATED ORAL at 23:29

## 2025-01-11 RX ADMIN — ACETAMINOPHEN 650 MG: 325 TABLET ORAL at 16:35

## 2025-01-11 RX ADMIN — IBUPROFEN 600 MG: 600 TABLET, FILM COATED ORAL at 12:17

## 2025-01-11 RX ADMIN — ACETAMINOPHEN 650 MG: 325 TABLET ORAL at 03:54

## 2025-01-11 RX ADMIN — DOCUSATE SODIUM 100 MG: 100 CAPSULE, LIQUID FILLED ORAL at 12:17

## 2025-01-11 RX ADMIN — IBUPROFEN 600 MG: 600 TABLET, FILM COATED ORAL at 19:00

## 2025-01-11 RX ADMIN — IBUPROFEN 600 MG: 600 TABLET, FILM COATED ORAL at 06:32

## 2025-01-11 NOTE — PROGRESS NOTES
Postpartum Progress Note     PPD#1    Brittani Rosas is a 34 y.o.  who is postpartum day #1. Patient is doing well and denies any complaints. Patient states pain is well controlled.  Patient states lochia is light. Patient denies any HA, vision changes, RUQ pain. Patient denies any postpartum depression or thoughts of harming herself or others. Patient is breastfeeding.      Vitals:    25 0100   BP: 101/67   Pulse: 60   Resp: 16   Temp: 98.1 °F (36.7 °C)   SpO2:          Physical Exam:  HEENT:Normocephalic and atraumatic, Thyroid WNL,   Lungs: CTA B/L, negative W/W/R   Abdomen: Soft, appropriate tenderness to palpation, Uterine fundus firm and below the umbilicus  Extremities: Negative swelling or cyanosis, DTRs WNL, negative clonus    Recent Results (from the past 24 hours)   CBC (No Diff)    Collection Time: 01/10/25  4:08 AM    Specimen: Blood   Result Value Ref Range    WBC 10.85 (H) 3.40 - 10.80 10*3/mm3    RBC 4.47 3.77 - 5.28 10*6/mm3    Hemoglobin 12.3 12.0 - 15.9 g/dL    Hematocrit 37.9 34.0 - 46.6 %    MCV 84.8 79.0 - 97.0 fL    MCH 27.5 26.6 - 33.0 pg    MCHC 32.5 31.5 - 35.7 g/dL    RDW 14.6 12.3 - 15.4 %    RDW-SD 45.1 37.0 - 54.0 fl    MPV 10.2 6.0 - 12.0 fL    Platelets 281 140 - 450 10*3/mm3   Treponema pallidum AB w/Reflex RPR    Collection Time: 01/10/25  4:08 AM    Specimen: Blood   Result Value Ref Range    Treponemal AB Total Non-Reactive Non-Reactive   Type & Screen    Collection Time: 01/10/25  4:08 AM    Specimen: Blood   Result Value Ref Range    ABO Type A     RH type Positive     Antibody Screen Negative     T&S Expiration Date 2025 11:59:59 PM    ]    ASSESSMENT/PLAN:    Patient is a 34 y.o.female who is PPD# 1s/p    -Rh+ /Rubella immune    -Encourage ambulation    -breastfeeding    -Patient denies any complaints, continue postpartum care        Kimberly See DO  2025 04:05 EST

## 2025-01-11 NOTE — ANESTHESIA POSTPROCEDURE EVALUATION
Patient: Brittani Rosas    Procedure Summary       Date: 01/10/25 Room / Location:     Anesthesia Start: 0438 Anesthesia Stop: 1009    Procedure: LABOR ANALGESIA Diagnosis:     Scheduled Providers:  Provider: Galina Ahumada CRNA    Anesthesia Type: epidural ASA Status: 2            Anesthesia Type: epidural    Vitals  Vitals Value Taken Time   /72 01/11/25 0959   Temp 36.8 °C (98.2 °F) 01/11/25 0959   Pulse 83 01/11/25 0959   Resp 18 01/11/25 0959   SpO2 88 % 01/10/25 0955   Vitals shown include unfiled device data.        Post Anesthesia Care and Evaluation    Patient location during evaluation: bedside  Patient participation: complete - patient participated  Level of consciousness: awake  Pain management: adequate    Airway patency: patent  Anesthetic complications: No anesthetic complications  PONV Status: controlled  Cardiovascular status: acceptable and stable  Respiratory status: acceptable  Hydration status: acceptable  Post Neuraxial Block status: Motor and sensory function returned to baseline and No signs or symptoms of PDPH

## 2025-01-11 NOTE — PLAN OF CARE
Goal Outcome Evaluation:  Plan of Care Reviewed With: patient        Progress: no change  Outcome Evaluation: PT is AAOx4, VSS, pain control with Ibuprofen and Tylenol, no c/o N/V/D, UOP adequate, no s/s of PPH, lochia is light, no report of clots, BF/pumping, providing infant care, no acute events this shift, continue with current POC at this time.

## 2025-01-11 NOTE — DISCHARGE SUMMARY
OB Discharge Summary        Admit Date:  1/10/2025  Date of Delivery: 1/10/2025  Discharge Date: 25      Reason for Admission:  Spontaneous labor     Final Diagnosis:  Patient Active Problem List   Diagnosis    PVC (premature ventricular contraction)    Antepartum multigravida of advanced maternal age    Supervision of other normal pregnancy, antepartum    Previous  delivery, antepartum    Abnormal glucose in pregnancy, antepartum    Encounter for induction of labor       To do at FU: 3-4 weeks postpartum     Antepartum:  Prenatal care is complicated by:  See above Final Diagnosis for list    Intrapartum/Delivery:  OB Surgeon: Dr. Mayi DO   Anesthesia: Epidural  Delivery Type:   Perineum : Vaginal   Feeding method: Breast    Infant: male infant; Viable     Weight: 3530 g (7 lb 12.5 oz)     APGARS: 8  @ 1 minute / 9  @ 5 minutes    Hospital Course/Significant Findings:  Patient is a 34 y.o.  at 39w5d who presented in spontaneous labor. She recieved epidural and progressed to complete. She had an uncomplicated  and postpartum stay.     On the day of discharge POSTPARTUM pt tolerating a regular diet, ambulating, pain well controlled, urinating spontaneously and lochia appropriate.   Vital signs were stable and afebrile.  Exam was within normal limits.  Fundus was below umbilicus and nontender.  Meeting discharge criteria and desired discharge home.  Postpartum instructions and FU reviewed and questions answered.    Recent Results (from the past 72 hours)   POC Urinalysis Dipstick    Collection Time: 25  4:15 PM    Specimen: Urine   Result Value Ref Range    Glucose, UA Negative Negative mg/dL    Protein, POC Negative Negative mg/dL   CBC (No Diff)    Collection Time: 01/10/25  4:08 AM    Specimen: Blood   Result Value Ref Range    WBC 10.85 (H) 3.40 - 10.80 10*3/mm3    RBC 4.47 3.77 - 5.28 10*6/mm3    Hemoglobin 12.3 12.0 - 15.9 g/dL    Hematocrit 37.9 34.0 - 46.6 %    MCV 84.8 79.0 -  97.0 fL    MCH 27.5 26.6 - 33.0 pg    MCHC 32.5 31.5 - 35.7 g/dL    RDW 14.6 12.3 - 15.4 %    RDW-SD 45.1 37.0 - 54.0 fl    MPV 10.2 6.0 - 12.0 fL    Platelets 281 140 - 450 10*3/mm3   Treponema pallidum AB w/Reflex RPR    Collection Time: 01/10/25  4:08 AM    Specimen: Blood   Result Value Ref Range    Treponemal AB Total Non-Reactive Non-Reactive   Type & Screen    Collection Time: 01/10/25  4:08 AM    Specimen: Blood   Result Value Ref Range    ABO Type A     RH type Positive     Antibody Screen Negative     T&S Expiration Date 1/13/2025 11:59:59 PM    ]    Physical Exam:  HEENT:Normocephalic and atraumatic, Thyroid WNL,   Lungs: CTA B/L, negative W/W/R   Abdomen: Soft, appropriate tenderness to palpation, Uterine fundus firm and below the umbilicus   Extremities: Negative swelling or cyanosis, DTRs WNL, negative clonus     Vitals:    01/12/25 0825   BP: 114/79   Pulse: 66   Resp: 16   Temp: 98 °F (36.7 °C)   SpO2:        Discharge:         Discharge Medications        New Medications        Instructions Start Date   ibuprofen 600 MG tablet  Commonly known as: ADVIL,MOTRIN   600 mg, Oral, Every 6 Hours Scheduled             Continue These Medications        Instructions Start Date   prenatal vitamin 27-0.8 27-0.8 MG tablet tablet   Daily             Stop These Medications      aspirin 81 MG chewable tablet                Disposition: Home  Diet: Regular    Pelvic Rest: 6 weeks    Condition at discharge: Good    Follow up with: Thelma Pérez DO or provider of her choice    Follow up in: Routine postpartum check 3-4 weeks      Complications: None      Electronically signed by:    Thelma Pérez DO  01/12/25  09:45 EST          [facility]  [address]  [place] [address]  Dept: [phone]  Dept Fax: [phone]  Loc: [phone]

## 2025-01-11 NOTE — LACTATION NOTE
LC in to see this  patient. Infant sleepy throughout the night and not wanting to latch. Patient has been pumping consistently every 2-3 hours and syringe feeding infant. Mother's nipples are tender and bruised from the pump. Encouraged mother to use lower setting while pumping and switch to a bigger flange size.     Assisted mother with a cross-cradle latch for feeding and infant fed for 15 mins on the L breast and 2 mins on the R breast. Audible swallows noted throughout feed and infant appeared relaxed at the end. No pain associated with latch. Encouraged mother to attempt feeding again in 2 h. And call LC for assistance as necessary.     Patient is planning on discharge today. LC discussed normal infant output patterns to expect and if infant is not waking by 3 hours to wake and feed using measures shown in the hospital. LC discussed checking to make sure new medications are safe to breastfeed. LC discussed alcohol use and cigarette/second hand smoke around baby and breastfeeding and discussed the impact of street drugs on infants and breastfeeding. LC used the page in the breastfeeding guide to discuss harmful effects of these. Breastfeeding/Lactation expectations and anticipatory guidance discussed for the next two weeks . LC discussed nipple care, plugged ducts, engorgement, and breast infection. LC encouraged mom to see pediatrician two days from discharge for follow up. Patient has a breastpump for home use and LC discussed good pumping guidelines and normal expectations with pumping and storage and preparation of ebm for feedings. LC discussed breastfeeding/lactation resources including the local breastfeeding support group after discharge and when to call the doctor. Patient showed good understanding.

## 2025-01-11 NOTE — PLAN OF CARE
Problem: Adult Inpatient Plan of Care  Goal: Plan of Care Review  Outcome: Progressing  Flowsheets  Taken 1/11/2025 1729 by Florecita Tan RN  Progress: improving  Taken 1/11/2025 0507 by Cassandra Mccarty LPN  Outcome Evaluation: PT is AAOx4, VSS, pain control with Ibuprofen and Tylenol, no c/o N/V/D, UOP adequate, no s/s of PPH, lochia is light, no report of clots, BF/pumping, providing infant care, no acute events this shift, continue with current POC at this time.  Plan of Care Reviewed With: patient  Goal: Patient-Specific Goal (Individualized)  Outcome: Progressing  Goal: Absence of Hospital-Acquired Illness or Injury  Outcome: Progressing  Goal: Optimal Comfort and Wellbeing  Outcome: Progressing  Goal: Readiness for Transition of Care  Outcome: Progressing   Goal Outcome Evaluation:           Progress: improving

## 2025-01-12 VITALS
HEART RATE: 66 BPM | OXYGEN SATURATION: 100 % | RESPIRATION RATE: 16 BRPM | SYSTOLIC BLOOD PRESSURE: 114 MMHG | TEMPERATURE: 98 F | DIASTOLIC BLOOD PRESSURE: 79 MMHG

## 2025-01-12 RX ADMIN — DOCUSATE SODIUM 100 MG: 100 CAPSULE, LIQUID FILLED ORAL at 08:23

## 2025-01-12 RX ADMIN — IBUPROFEN 600 MG: 600 TABLET, FILM COATED ORAL at 06:11

## 2025-01-12 RX ADMIN — WITCH HAZEL: 500 SOLUTION RECTAL; TOPICAL at 11:01

## 2025-01-12 RX ADMIN — IBUPROFEN 600 MG: 600 TABLET, FILM COATED ORAL at 11:01

## 2025-01-12 NOTE — PROGRESS NOTES
Postpartum Progress Note     PPD#2     Brittani Rosas is a 34 y.o.  who is postpartum day #2. Patient is doing well and denies any complaints. Patient states pain is controlled.  Patient states lochia is light. Patient is breastfeed      Vitals:    25 0825   BP: 114/79   Pulse: 66   Resp: 16   Temp: 98 °F (36.7 °C)   SpO2:          Physical Exam:  HEENT:Normocephalic and atraumatic, NAD  Lungs: No labored breathing  Abdomen: Soft, appropriate tenderness to palpation, Uterine fundus firm and below the umbilicus   Extremities: Negative swelling or cyanosis, negative clonus    No results found for this or any previous visit (from the past 24 hours).]    ASSESSMENT/PLAN:    Patient is a 34 y.o.female who is PPD# 2 s/p     -Rh pos/Rubella immune   -Encourage ambulation    -breastfeed    -Patient denies any complaints , continue postpartum care      Stable for DC home    Thelma Pérez DO  2025 09:44 EST

## 2025-01-12 NOTE — PLAN OF CARE
Problem: Adult Inpatient Plan of Care  Goal: Plan of Care Review  Outcome: Progressing  Flowsheets  Taken 1/12/2025 0424 by Zora Islas RN  Progress: improving  Outcome Evaluation: Vital signs stable, up ad diego, pain managed, bleeding light, breastfeeding well, interacting well with infant.  Taken 1/11/2025 0507 by Cassandra Mccarty LPN  Plan of Care Reviewed With: patient  Goal: Patient-Specific Goal (Individualized)  Outcome: Progressing  Goal: Absence of Hospital-Acquired Illness or Injury  Outcome: Progressing  Intervention: Identify and Manage Fall Risk  Recent Flowsheet Documentation  Taken 1/12/2025 0405 by Zora Islas RN  Safety Promotion/Fall Prevention: safety round/check completed  Taken 1/12/2025 0315 by Zora Islas RN  Safety Promotion/Fall Prevention: safety round/check completed  Taken 1/12/2025 0105 by Zora Islas RN  Safety Promotion/Fall Prevention: safety round/check completed  Taken 1/12/2025 0015 by Zora Islas RN  Safety Promotion/Fall Prevention: safety round/check completed  Taken 1/11/2025 2329 by Zora Islas RN  Safety Promotion/Fall Prevention: safety round/check completed  Taken 1/11/2025 2120 by Zora Islas RN  Safety Promotion/Fall Prevention: safety round/check completed  Taken 1/11/2025 2035 by Zora Islas RN  Safety Promotion/Fall Prevention: safety round/check completed  Taken 1/11/2025 1925 by Zora Islas RN  Safety Promotion/Fall Prevention: safety round/check completed  Intervention: Prevent Skin Injury  Recent Flowsheet Documentation  Taken 1/11/2025 2120 by Zora Islas RN  Body Position: position changed independently  Intervention: Prevent Infection  Recent Flowsheet Documentation  Taken 1/11/2025 2120 by Zora Islas RN  Infection Prevention:   hand hygiene promoted   personal protective equipment utilized  Goal: Optimal Comfort and Wellbeing  Outcome: Progressing  Intervention: Monitor Pain and Promote Comfort  Recent Flowsheet  Documentation  Taken 1/11/2025 2329 by Zora Islas RN  Pain Management Interventions:   care clustered   pain medication given   quiet environment facilitated  Intervention: Provide Person-Centered Care  Recent Flowsheet Documentation  Taken 1/11/2025 2120 by Zora Islas RN  Trust Relationship/Rapport:   care explained   choices provided   questions answered   questions encouraged  Goal: Readiness for Transition of Care  Outcome: Progressing     Problem: Postpartum (Vaginal Delivery)  Goal: Successful Parent Role Transition  Outcome: Progressing  Intervention: Support Parent Role Transition  Recent Flowsheet Documentation  Taken 1/11/2025 2120 by Zora Islas RN  Supportive Measures:   active listening utilized   self-care encouraged   positive reinforcement provided  Parent-Child Attachment Promotion:   caring behavior modeled   cue recognition promoted   face-to-face positioning promoted   interaction encouraged   parent/caregiver presence encouraged   participation in care promoted   positive reinforcement provided  Goal: Hemostasis  Outcome: Progressing  Goal: Absence of Infection Signs and Symptoms  Outcome: Progressing  Goal: Anesthesia/Sedation Recovery  Outcome: Progressing  Intervention: Optimize Anesthesia Recovery  Recent Flowsheet Documentation  Taken 1/12/2025 0405 by Zora Islas RN  Safety Promotion/Fall Prevention: safety round/check completed  Taken 1/12/2025 0315 by Zora Islas RN  Safety Promotion/Fall Prevention: safety round/check completed  Taken 1/12/2025 0105 by Zora Islas RN  Safety Promotion/Fall Prevention: safety round/check completed  Taken 1/12/2025 0015 by Zora Islas RN  Safety Promotion/Fall Prevention: safety round/check completed  Taken 1/11/2025 2329 by Zora Islas RN  Safety Promotion/Fall Prevention: safety round/check completed  Taken 1/11/2025 2120 by Zora Islas RN  Safety Promotion/Fall Prevention: safety round/check completed  Taken  1/11/2025 2035 by Zora Islas RN  Safety Promotion/Fall Prevention: safety round/check completed  Taken 1/11/2025 1925 by Zora Islas RN  Safety Promotion/Fall Prevention: safety round/check completed  Goal: Optimal Pain Control and Function  Outcome: Progressing  Intervention: Prevent or Manage Pain  Recent Flowsheet Documentation  Taken 1/11/2025 2329 by Zora Islas RN  Pain Management Interventions:   care clustered   pain medication given   quiet environment facilitated  Goal: Effective Urinary Elimination  Outcome: Progressing     Problem: Breastfeeding  Goal: Effective Breastfeeding  Outcome: Progressing  Intervention: Promote Effective Breastfeeding  Recent Flowsheet Documentation  Taken 1/11/2025 2120 by Zora Islas RN  Parent-Child Attachment Promotion:   caring behavior modeled   cue recognition promoted   face-to-face positioning promoted   interaction encouraged   parent/caregiver presence encouraged   participation in care promoted   positive reinforcement provided  Intervention: Support Exclusive Breastfeeding Success  Recent Flowsheet Documentation  Taken 1/11/2025 2120 by Zora Islas RN  Supportive Measures:   active listening utilized   self-care encouraged   positive reinforcement provided   Goal Outcome Evaluation:           Progress: improving  Outcome Evaluation: Vital signs stable, up ad diego, pain managed, bleeding light, breastfeeding well, interacting well with infant.

## 2025-01-12 NOTE — PLAN OF CARE
Problem: Adult Inpatient Plan of Care  Goal: Plan of Care Review  Outcome: Met  Goal: Patient-Specific Goal (Individualized)  Outcome: Met  Goal: Absence of Hospital-Acquired Illness or Injury  Outcome: Met  Intervention: Identify and Manage Fall Risk  Recent Flowsheet Documentation  Taken 1/12/2025 0826 by Ivania Watt RN  Safety Promotion/Fall Prevention: safety round/check completed  Intervention: Prevent Skin Injury  Recent Flowsheet Documentation  Taken 1/12/2025 0826 by Ivania Watt RN  Body Position: position changed independently  Intervention: Prevent Infection  Recent Flowsheet Documentation  Taken 1/12/2025 0826 by Ivania Watt RN  Infection Prevention:   hand hygiene promoted   personal protective equipment utilized   rest/sleep promoted  Goal: Optimal Comfort and Wellbeing  Outcome: Met  Intervention: Provide Person-Centered Care  Recent Flowsheet Documentation  Taken 1/12/2025 0826 by Ivania Watt RN  Trust Relationship/Rapport:   care explained   emotional support provided   choices provided   empathic listening provided   questions answered   questions encouraged   thoughts/feelings acknowledged  Goal: Readiness for Transition of Care  Outcome: Met     Problem: Postpartum (Vaginal Delivery)  Goal: Successful Parent Role Transition  Outcome: Met  Goal: Hemostasis  Outcome: Met  Goal: Absence of Infection Signs and Symptoms  Outcome: Met  Goal: Anesthesia/Sedation Recovery  Outcome: Met  Intervention: Optimize Anesthesia Recovery  Recent Flowsheet Documentation  Taken 1/12/2025 0826 by Ivania Watt RN  Safety Promotion/Fall Prevention: safety round/check completed  Goal: Optimal Pain Control and Function  Outcome: Met  Goal: Effective Urinary Elimination  Outcome: Met  Intervention: Monitor and Manage Urinary Retention  Recent Flowsheet Documentation  Taken 1/12/2025 0826 by Ivania Watt RN  Urinary Elimination Promotion: frequent voiding encouraged     Problem: Breastfeeding  Goal: Effective  Breastfeeding  Outcome: Met   Goal Outcome Evaluation:  Patient has met goals and is being discharged home per MD.

## 2025-01-12 NOTE — DISCHARGE INSTRUCTIONS
Pelvic rest for 6 weeks, no intercourse for 6 weeks, no tampons or douching for 6 weeks, nothing in the vagina for 6 weeks     No lifting more than 15 to 20 pounds for 2 weeks  No tub baths for 2 weeks, but may shower  Keep the incision clean and dry    Call the office or go to the emergency department for temperature greater than 100 °F, shortness of breath or chest pain, excessive nausea or vomiting, pain that is worsening despite current pain medications, redness, swelling, or drainage from the incision site, vaginal bleeding soaking a pad in less than 1 hour.

## 2025-01-21 ENCOUNTER — PATIENT MESSAGE (OUTPATIENT)
Dept: OBSTETRICS AND GYNECOLOGY | Facility: CLINIC | Age: 35
End: 2025-01-21

## 2025-01-21 ENCOUNTER — MATERNAL SCREENING (OUTPATIENT)
Dept: CALL CENTER | Facility: HOSPITAL | Age: 35
End: 2025-01-21
Payer: COMMERCIAL

## 2025-01-21 NOTE — OUTREACH NOTE
Maternal Screening Survey      Flowsheet Row Responses   Facility patient discharged from? [facility]   Attempt successful? No   Unsuccessful attempts Attempt 2              David GERMAIN - Registered Nurse

## 2025-01-21 NOTE — OUTREACH NOTE
Maternal Screening Survey      Flowsheet Row Responses   Facility patient discharged from? [facility]   Attempt successful? No   Unsuccessful attempts Attempt 1              David GERMAIN - Registered Nurse

## 2025-01-22 ENCOUNTER — MATERNAL SCREENING (OUTPATIENT)
Dept: CALL CENTER | Facility: HOSPITAL | Age: 35
End: 2025-01-22
Payer: COMMERCIAL

## 2025-01-22 ENCOUNTER — TELEPHONE (OUTPATIENT)
Dept: LACTATION | Facility: HOSPITAL | Age: 35
End: 2025-01-22
Payer: COMMERCIAL

## 2025-01-22 NOTE — OUTREACH NOTE
Maternal Screening Survey      Flowsheet Row Responses   Facility patient discharged from? [facility]   Attempt successful? No   Unsuccessful attempts Attempt 3   Revoke Decline to participate              Yuni MCCAULEY - Registered Nurse

## 2025-01-22 NOTE — TELEPHONE ENCOUNTER
LN called to check with this patient and her breastfeeding progress. Patient did not answer the phone so  provided the lactation dept number ([phone]) and encouraged to call for any lactation needs.

## 2025-02-11 ENCOUNTER — TELEPHONE (OUTPATIENT)
Dept: OBSTETRICS AND GYNECOLOGY | Facility: CLINIC | Age: 35
End: 2025-02-11

## 2025-02-25 ENCOUNTER — POSTPARTUM VISIT (OUTPATIENT)
Dept: OBSTETRICS AND GYNECOLOGY | Facility: CLINIC | Age: 35
End: 2025-02-25
Payer: COMMERCIAL

## 2025-02-25 VITALS
BODY MASS INDEX: 29.08 KG/M2 | SYSTOLIC BLOOD PRESSURE: 122 MMHG | HEIGHT: 62 IN | DIASTOLIC BLOOD PRESSURE: 80 MMHG | WEIGHT: 158 LBS

## 2025-02-25 NOTE — PROGRESS NOTES
"  POSTPARTUM Follow Up Visit      Chief Complaint   Patient presents with    Postpartum Care     HPI:      Date of delivery: 1/10/25  Delivery type:            Perineum : Vaginal   Delivering Provider:   Dr. Kimberly See      Feeding: Breast  Pain:  No  Vaginal Bleeding:  No  Last pap date and result: Normal pap 2023         Postpartum Depression: Low Risk  (2025)    [place]  Depression Scale     Last EPDS Total Score: 4     Last EPDS Self Harm Result: Never       PHYSICAL EXAM:  /80   Ht 157.5 cm (62\")   Wt 71.7 kg (158 lb)   LMP 2024   Breastfeeding Yes   BMI 28.90 kg/m²  13.6 kg (30 lb)  General- NAD, alert and oriented, appropriate  Psych- Normal mood, good memory, good eye contact  Abdomen- Soft, non distended, non tender, no masses  External genitalia- Normal.    Urethra/Bladder/Vagina- Normal, no masses, non-tender, no prolapse   STITCHES : Intact, well approximated, no evidence of infection  Cvx- Normal, no lesions, no discharge, no CMT  Uterus- Normal size, shape & consistency.  Non tender, mobile.  Adnexa- Normal, no mass, non-tender    Lymphatic- No palpable groin nodes  Ext- No edema    ASSESSMENT AND PLAN:  Diagnoses and all orders for this visit:    1. Postpartum care following vaginal delivery (Primary)      Counseling:    All birth control options reviewed in detail.  R/B/A/SE/E of each wrt pts PMHx and prior BC use  Ok to resume intercourse  May resume intercourse once 4 weeks postpartum  May resume normal activities  Core strengthening exercises reviewed and recommended  Kegel exercises reviewed and recommended  Ok to return to work/school once patient desires/maternity leave completed      Follow Up:  Return in about 1 year (around 2026) for Annual exam.    I spent 20 minutes on the separately reported service of Postpartum . This time is not included in the time used to support the E/M service also reported today.    Kimberly See, " DO  02/25/2025    [facility]  [address] [place] [address]  Dept: [phone]  Dept Fax: [phone]  Loc: [phone]  Loc Fax: [phone]

## 2025-03-05 ENCOUNTER — TELEPHONE (OUTPATIENT)
Dept: OBSTETRICS AND GYNECOLOGY | Facility: CLINIC | Age: 35
End: 2025-03-05
Payer: COMMERCIAL

## 2025-03-05 DIAGNOSIS — F41.9 ANXIETY: Primary | ICD-10-CM

## 2025-03-05 RX ORDER — BUSPIRONE HYDROCHLORIDE 15 MG/1
15 TABLET ORAL 2 TIMES DAILY
Qty: 60 TABLET | Refills: 2 | Status: SHIPPED | OUTPATIENT
Start: 2025-03-05

## 2025-03-05 NOTE — TELEPHONE ENCOUNTER
Buspar BID sent to pharmacy.   Electronically signed by:    Kimberly See DO  03/05/25  17:12 EST

## 2025-07-10 DIAGNOSIS — R10.11 RIGHT UPPER QUADRANT ABDOMINAL PAIN: Primary | ICD-10-CM

## 2025-07-15 ENCOUNTER — HOSPITAL ENCOUNTER (OUTPATIENT)
Dept: ULTRASOUND IMAGING | Facility: HOSPITAL | Age: 35
Discharge: HOME | End: 2025-07-15
Admitting: NURSE PRACTITIONER
Payer: COMMERCIAL

## 2025-07-15 DIAGNOSIS — R10.11 RIGHT UPPER QUADRANT ABDOMINAL PAIN: ICD-10-CM

## 2025-07-15 PROCEDURE — 76705 ECHO EXAM OF ABDOMEN: CPT

## 2025-07-16 ENCOUNTER — TELEPHONE (OUTPATIENT)
Dept: GASTROENTEROLOGY | Facility: CLINIC | Age: 35
End: 2025-07-16
Payer: COMMERCIAL

## 2025-07-16 DIAGNOSIS — R10.10 UPPER ABDOMINAL PAIN: Primary | ICD-10-CM

## 2025-07-16 NOTE — TELEPHONE ENCOUNTER
Patient reports experiencing intermittent upper abdominal pain. Has been present intermittently and is not associated with meals.

## 2025-08-14 ENCOUNTER — OFFICE VISIT (OUTPATIENT)
Dept: INTERNAL MEDICINE | Facility: CLINIC | Age: 35
End: 2025-08-14
Payer: COMMERCIAL

## 2025-08-14 VITALS
HEIGHT: 62 IN | HEART RATE: 69 BPM | SYSTOLIC BLOOD PRESSURE: 98 MMHG | OXYGEN SATURATION: 98 % | BODY MASS INDEX: 27.42 KG/M2 | RESPIRATION RATE: 18 BRPM | WEIGHT: 149 LBS | DIASTOLIC BLOOD PRESSURE: 62 MMHG | TEMPERATURE: 97.6 F

## 2025-08-14 DIAGNOSIS — R00.2 PALPITATIONS: Primary | ICD-10-CM

## 2025-08-14 DIAGNOSIS — R10.11 RUQ PAIN: ICD-10-CM

## 2025-08-14 DIAGNOSIS — Z86.2 HISTORY OF ANEMIA: ICD-10-CM

## 2025-08-14 LAB
ALBUMIN SERPL-MCNC: 4.6 G/DL (ref 3.5–5.2)
ALBUMIN/GLOB SERPL: 1.6 G/DL
ALP SERPL-CCNC: 99 U/L (ref 39–117)
ALT SERPL W P-5'-P-CCNC: 12 U/L (ref 1–33)
ANION GAP SERPL CALCULATED.3IONS-SCNC: 13.3 MMOL/L (ref 5–15)
AST SERPL-CCNC: 19 U/L (ref 1–32)
BASOPHILS # BLD AUTO: 0.06 10*3/MM3 (ref 0–0.2)
BASOPHILS NFR BLD AUTO: 0.7 % (ref 0–1.5)
BILIRUB SERPL-MCNC: 0.6 MG/DL (ref 0–1.2)
BUN SERPL-MCNC: 12 MG/DL (ref 6–20)
BUN/CREAT SERPL: 16.9 (ref 7–25)
CALCIUM SPEC-SCNC: 10 MG/DL (ref 8.6–10.5)
CHLORIDE SERPL-SCNC: 101 MMOL/L (ref 98–107)
CHOLEST SERPL-MCNC: 173 MG/DL (ref 0–200)
CO2 SERPL-SCNC: 23.7 MMOL/L (ref 22–29)
CREAT SERPL-MCNC: 0.71 MG/DL (ref 0.57–1)
DEPRECATED RDW RBC AUTO: 42 FL (ref 37–54)
EGFRCR SERPLBLD CKD-EPI 2021: 112.5 ML/MIN/1.73
EOSINOPHIL # BLD AUTO: 0.12 10*3/MM3 (ref 0–0.4)
EOSINOPHIL NFR BLD AUTO: 1.3 % (ref 0.3–6.2)
ERYTHROCYTE [DISTWIDTH] IN BLOOD BY AUTOMATED COUNT: 13.1 % (ref 12.3–15.4)
FERRITIN SERPL-MCNC: 61.6 NG/ML (ref 13–150)
FOLATE SERPL-MCNC: 10.8 NG/ML (ref 4.78–24.2)
GLOBULIN UR ELPH-MCNC: 2.8 GM/DL
GLUCOSE SERPL-MCNC: 94 MG/DL (ref 65–99)
HCT VFR BLD AUTO: 42 % (ref 34–46.6)
HDLC SERPL-MCNC: 65 MG/DL (ref 40–60)
HGB BLD-MCNC: 13.9 G/DL (ref 12–15.9)
IMM GRANULOCYTES # BLD AUTO: 0.02 10*3/MM3 (ref 0–0.05)
IMM GRANULOCYTES NFR BLD AUTO: 0.2 % (ref 0–0.5)
IRON 24H UR-MRATE: 95 MCG/DL (ref 37–145)
IRON SATN MFR SERPL: 23 % (ref 20–50)
LDLC SERPL CALC-MCNC: 89 MG/DL (ref 0–100)
LDLC/HDLC SERPL: 1.34 {RATIO}
LYMPHOCYTES # BLD AUTO: 2.87 10*3/MM3 (ref 0.7–3.1)
LYMPHOCYTES NFR BLD AUTO: 31.6 % (ref 19.6–45.3)
MCH RBC QN AUTO: 29.3 PG (ref 26.6–33)
MCHC RBC AUTO-ENTMCNC: 33.1 G/DL (ref 31.5–35.7)
MCV RBC AUTO: 88.4 FL (ref 79–97)
MONOCYTES # BLD AUTO: 0.46 10*3/MM3 (ref 0.1–0.9)
MONOCYTES NFR BLD AUTO: 5.1 % (ref 5–12)
NEUTROPHILS NFR BLD AUTO: 5.55 10*3/MM3 (ref 1.7–7)
NEUTROPHILS NFR BLD AUTO: 61.1 % (ref 42.7–76)
NRBC BLD AUTO-RTO: 0 /100 WBC (ref 0–0.2)
PLATELET # BLD AUTO: 316 10*3/MM3 (ref 140–450)
PMV BLD AUTO: 9.9 FL (ref 6–12)
POTASSIUM SERPL-SCNC: 4.2 MMOL/L (ref 3.5–5.2)
PROT SERPL-MCNC: 7.4 G/DL (ref 6–8.5)
RBC # BLD AUTO: 4.75 10*6/MM3 (ref 3.77–5.28)
SODIUM SERPL-SCNC: 138 MMOL/L (ref 136–145)
TIBC SERPL-MCNC: 416 MCG/DL (ref 298–536)
TRANSFERRIN SERPL-MCNC: 279 MG/DL (ref 200–360)
TRIGL SERPL-MCNC: 104 MG/DL (ref 0–150)
TSH SERPL DL<=0.05 MIU/L-ACNC: 1.18 UIU/ML (ref 0.27–4.2)
VIT B12 BLD-MCNC: 358 PG/ML (ref 211–946)
VLDLC SERPL-MCNC: 19 MG/DL (ref 5–40)
WBC NRBC COR # BLD AUTO: 9.08 10*3/MM3 (ref 3.4–10.8)

## 2025-08-14 PROCEDURE — 82728 ASSAY OF FERRITIN: CPT | Performed by: NURSE PRACTITIONER

## 2025-08-14 PROCEDURE — 80050 GENERAL HEALTH PANEL: CPT | Performed by: NURSE PRACTITIONER

## 2025-08-14 PROCEDURE — 82746 ASSAY OF FOLIC ACID SERUM: CPT | Performed by: NURSE PRACTITIONER

## 2025-08-14 PROCEDURE — 82607 VITAMIN B-12: CPT | Performed by: NURSE PRACTITIONER

## 2025-08-14 PROCEDURE — 84466 ASSAY OF TRANSFERRIN: CPT | Performed by: NURSE PRACTITIONER

## 2025-08-14 PROCEDURE — 83540 ASSAY OF IRON: CPT | Performed by: NURSE PRACTITIONER

## 2025-08-14 PROCEDURE — 80061 LIPID PANEL: CPT | Performed by: NURSE PRACTITIONER
